# Patient Record
Sex: MALE | Race: WHITE | NOT HISPANIC OR LATINO | Employment: FULL TIME | URBAN - METROPOLITAN AREA
[De-identification: names, ages, dates, MRNs, and addresses within clinical notes are randomized per-mention and may not be internally consistent; named-entity substitution may affect disease eponyms.]

---

## 2023-09-19 PROBLEM — I47.10 SUPRAVENTRICULAR TACHYCARDIA, PAROXYSMAL (CMS-HCC): Status: ACTIVE | Noted: 2023-09-19

## 2023-09-19 PROBLEM — I47.20 VENTRICULAR TACHYCARDIA (PAROXYSMAL): Status: ACTIVE | Noted: 2023-09-19

## 2023-09-19 PROBLEM — R41.3 MEMORY LOSS: Status: ACTIVE | Noted: 2023-09-19

## 2023-09-19 PROBLEM — R00.1 BRADYCARDIA BY ELECTROCARDIOGRAM: Status: ACTIVE | Noted: 2023-09-19

## 2023-09-19 PROBLEM — G47.9 DISTURBANCE OF SLEEP: Status: ACTIVE | Noted: 2023-09-19

## 2023-09-19 PROBLEM — I10 HYPERTENSION: Status: ACTIVE | Noted: 2023-09-19

## 2023-09-19 PROBLEM — M70.61 TROCHANTERIC BURSITIS OF RIGHT HIP: Status: ACTIVE | Noted: 2023-09-19

## 2023-09-19 PROBLEM — M19.90 ARTHRITIS: Status: ACTIVE | Noted: 2023-09-19

## 2023-09-19 PROBLEM — R55 SYNCOPE: Status: ACTIVE | Noted: 2023-09-19

## 2023-09-19 PROBLEM — E78.1 HYPERTRIGLYCERIDEMIA: Status: ACTIVE | Noted: 2023-09-19

## 2023-09-19 PROBLEM — M76.31 ILIOTIBIAL BAND SYNDROME OF RIGHT SIDE: Status: ACTIVE | Noted: 2023-09-19

## 2023-09-19 PROBLEM — Z95.1 S/P CABG X 4: Status: ACTIVE | Noted: 2023-09-19

## 2023-09-19 PROBLEM — H81.13 BENIGN PAROXYSMAL POSITIONAL VERTIGO DUE TO BILATERAL VESTIBULAR DISORDER: Status: ACTIVE | Noted: 2023-09-19

## 2023-09-19 PROBLEM — F41.9 ANXIETY: Status: ACTIVE | Noted: 2023-09-19

## 2023-09-19 PROBLEM — I49.3 PVC (PREMATURE VENTRICULAR CONTRACTION): Status: ACTIVE | Noted: 2023-09-19

## 2023-09-19 PROBLEM — R94.31 ABNORMAL EKG: Status: ACTIVE | Noted: 2023-09-19

## 2023-09-19 PROBLEM — I47.29 VENTRICULAR TACHYCARDIA (PAROXYSMAL) (MULTI): Status: ACTIVE | Noted: 2023-09-19

## 2023-09-19 PROBLEM — T78.40XA ALLERGIES: Status: ACTIVE | Noted: 2023-09-19

## 2023-09-19 PROBLEM — G47.33 OBSTRUCTIVE SLEEP APNEA ON CPAP: Status: ACTIVE | Noted: 2023-09-19

## 2023-09-19 PROBLEM — J30.2 SEASONAL ALLERGIC RHINITIS: Status: ACTIVE | Noted: 2022-03-24

## 2023-09-19 PROBLEM — I25.10 CAD, MULTIPLE VESSEL: Status: ACTIVE | Noted: 2023-09-19

## 2023-09-19 PROBLEM — H81.4 VERTIGO OF CENTRAL ORIGIN: Status: ACTIVE | Noted: 2022-04-26

## 2023-09-19 PROBLEM — D72.829 ELEVATED WBC COUNT: Status: ACTIVE | Noted: 2023-09-19

## 2023-09-19 PROBLEM — H90.3 ASYMMETRICAL SENSORINEURAL HEARING LOSS: Status: ACTIVE | Noted: 2022-04-26

## 2023-09-19 PROBLEM — H90.3 SENSORINEURAL HEARING LOSS, BILATERAL: Status: ACTIVE | Noted: 2022-04-26

## 2023-09-19 PROBLEM — R94.30 ABNORMAL CARDIOVASCULAR FUNCTION STUDY: Status: ACTIVE | Noted: 2023-09-19

## 2023-09-19 PROBLEM — G31.84 MILD COGNITIVE IMPAIRMENT: Status: ACTIVE | Noted: 2023-09-19

## 2023-09-19 PROBLEM — E66.3 OVERWEIGHT WITH BODY MASS INDEX (BMI) OF 28 TO 28.9 IN ADULT: Status: ACTIVE | Noted: 2023-09-19

## 2023-09-19 PROBLEM — E78.5 HYPERLIPIDEMIA: Status: ACTIVE | Noted: 2023-09-19

## 2023-09-19 PROBLEM — H93.13 BILATERAL TINNITUS: Status: ACTIVE | Noted: 2022-03-24

## 2023-09-19 PROBLEM — R73.09 ELEVATED GLUCOSE LEVEL: Status: ACTIVE | Noted: 2023-09-19

## 2023-09-19 PROBLEM — J01.90 ACUTE NON-RECURRENT SINUSITIS: Status: ACTIVE | Noted: 2023-09-19

## 2023-09-19 PROBLEM — R40.0 DAYTIME SOMNOLENCE: Status: ACTIVE | Noted: 2023-09-19

## 2023-09-19 RX ORDER — NITROGLYCERIN 0.4 MG/1
TABLET SUBLINGUAL
COMMUNITY
End: 2024-05-22 | Stop reason: ALTCHOICE

## 2023-09-19 RX ORDER — OXYMETAZOLINE HCL 0.05 %
30 SPRAY, NON-AEROSOL (ML) NASAL NIGHTLY
COMMUNITY
End: 2023-11-09 | Stop reason: WASHOUT

## 2023-09-19 RX ORDER — ASPIRIN 81 MG/1
1 TABLET ORAL DAILY
COMMUNITY
Start: 2021-10-29

## 2023-09-19 RX ORDER — ATORVASTATIN CALCIUM 80 MG/1
1 TABLET, FILM COATED ORAL NIGHTLY
COMMUNITY
Start: 2021-12-03 | End: 2023-11-09 | Stop reason: WASHOUT

## 2023-09-19 RX ORDER — METOPROLOL SUCCINATE 25 MG/1
0.5 TABLET, EXTENDED RELEASE ORAL DAILY
COMMUNITY
Start: 2022-09-08 | End: 2023-11-09 | Stop reason: WASHOUT

## 2023-09-19 RX ORDER — POTASSIUM CHLORIDE 20 MEQ/1
1 TABLET, EXTENDED RELEASE ORAL DAILY
COMMUNITY
End: 2023-11-09 | Stop reason: WASHOUT

## 2023-09-19 RX ORDER — CHLORHEXIDINE GLUCONATE ORAL RINSE 1.2 MG/ML
15 SOLUTION DENTAL DAILY
COMMUNITY
End: 2023-11-09 | Stop reason: WASHOUT

## 2023-09-19 RX ORDER — FUROSEMIDE 40 MG/1
1 TABLET ORAL DAILY
COMMUNITY
End: 2023-11-09 | Stop reason: WASHOUT

## 2023-09-19 RX ORDER — ATORVASTATIN CALCIUM 20 MG/1
TABLET, FILM COATED ORAL
COMMUNITY
End: 2023-11-09 | Stop reason: WASHOUT

## 2023-09-19 RX ORDER — LISINOPRIL 20 MG/1
TABLET ORAL
COMMUNITY
End: 2023-11-09 | Stop reason: WASHOUT

## 2023-09-19 RX ORDER — CLOPIDOGREL BISULFATE 75 MG/1
1 TABLET ORAL DAILY
COMMUNITY
End: 2023-11-09 | Stop reason: WASHOUT

## 2023-09-19 RX ORDER — LISINOPRIL 10 MG/1
TABLET ORAL
COMMUNITY
End: 2023-11-09 | Stop reason: WASHOUT

## 2023-09-19 RX ORDER — AZELASTINE 1 MG/ML
2 SPRAY, METERED NASAL 2 TIMES DAILY
COMMUNITY
End: 2023-11-09 | Stop reason: WASHOUT

## 2023-09-19 RX ORDER — POLYETHYLENE GLYCOL 3350 17 G/17G
17 POWDER, FOR SOLUTION ORAL DAILY
COMMUNITY
Start: 2021-11-27 | End: 2023-11-09 | Stop reason: WASHOUT

## 2023-09-19 RX ORDER — DOXYCYCLINE HYCLATE 20 MG
TABLET ORAL
COMMUNITY
Start: 2021-12-09 | End: 2023-11-09 | Stop reason: WASHOUT

## 2023-09-19 RX ORDER — AMLODIPINE BESYLATE 5 MG/1
TABLET ORAL
COMMUNITY
End: 2023-11-09 | Stop reason: WASHOUT

## 2023-09-19 RX ORDER — METOPROLOL TARTRATE 50 MG/1
1 TABLET ORAL 2 TIMES DAILY
COMMUNITY
End: 2023-11-09 | Stop reason: WASHOUT

## 2023-10-19 ENCOUNTER — APPOINTMENT (OUTPATIENT)
Dept: CARDIOLOGY | Facility: CLINIC | Age: 68
End: 2023-10-19
Payer: MEDICARE

## 2023-11-04 PROBLEM — R42 DIZZINESS: Status: ACTIVE | Noted: 2022-06-06

## 2023-11-04 RX ORDER — METRONIDAZOLE 7.5 MG/G
CREAM TOPICAL
COMMUNITY
Start: 2021-01-15 | End: 2023-11-09 | Stop reason: WASHOUT

## 2023-11-04 RX ORDER — FLUTICASONE PROPIONATE 50 MCG
2 SPRAY, SUSPENSION (ML) NASAL DAILY
COMMUNITY
Start: 2023-01-16 | End: 2023-11-09 | Stop reason: WASHOUT

## 2023-11-04 RX ORDER — LISINOPRIL AND HYDROCHLOROTHIAZIDE 10; 12.5 MG/1; MG/1
1 TABLET ORAL
COMMUNITY
End: 2023-11-09 | Stop reason: WASHOUT

## 2023-11-04 RX ORDER — LORATADINE 10 MG/1
10 TABLET ORAL
COMMUNITY
Start: 2023-01-16 | End: 2023-11-09 | Stop reason: WASHOUT

## 2023-11-09 ENCOUNTER — OFFICE VISIT (OUTPATIENT)
Dept: CARDIOLOGY | Facility: CLINIC | Age: 68
End: 2023-11-09
Payer: MEDICARE

## 2023-11-09 VITALS
SYSTOLIC BLOOD PRESSURE: 140 MMHG | BODY MASS INDEX: 28.2 KG/M2 | WEIGHT: 197 LBS | HEIGHT: 70 IN | DIASTOLIC BLOOD PRESSURE: 82 MMHG | HEART RATE: 59 BPM

## 2023-11-09 DIAGNOSIS — I25.10 CAD, MULTIPLE VESSEL: Primary | ICD-10-CM

## 2023-11-09 DIAGNOSIS — E78.1 HYPERTRIGLYCERIDEMIA: ICD-10-CM

## 2023-11-09 DIAGNOSIS — Z95.1 S/P CABG X 4: ICD-10-CM

## 2023-11-09 DIAGNOSIS — I49.3 PVC (PREMATURE VENTRICULAR CONTRACTION): ICD-10-CM

## 2023-11-09 DIAGNOSIS — I10 HYPERTENSION, UNSPECIFIED TYPE: ICD-10-CM

## 2023-11-09 PROCEDURE — 99214 OFFICE O/P EST MOD 30 MIN: CPT | Performed by: INTERNAL MEDICINE

## 2023-11-09 PROCEDURE — 3079F DIAST BP 80-89 MM HG: CPT | Performed by: INTERNAL MEDICINE

## 2023-11-09 PROCEDURE — 93000 ELECTROCARDIOGRAM COMPLETE: CPT | Performed by: INTERNAL MEDICINE

## 2023-11-09 PROCEDURE — 3077F SYST BP >= 140 MM HG: CPT | Performed by: INTERNAL MEDICINE

## 2023-11-09 PROCEDURE — 1126F AMNT PAIN NOTED NONE PRSNT: CPT | Performed by: INTERNAL MEDICINE

## 2023-11-09 PROCEDURE — 1036F TOBACCO NON-USER: CPT | Performed by: INTERNAL MEDICINE

## 2023-11-09 RX ORDER — ATORVASTATIN CALCIUM 40 MG/1
40 TABLET, FILM COATED ORAL DAILY
Qty: 90 TABLET | Refills: 3 | Status: SHIPPED | OUTPATIENT
Start: 2023-11-09 | End: 2024-11-08

## 2023-11-09 RX ORDER — MULTIVITAMIN
TABLET ORAL
COMMUNITY
Start: 2021-12-09

## 2023-11-09 NOTE — PATIENT INSTRUCTIONS
Thanks for following up in office today.    1)  We are going to reduce your atorvastatin dose to 40mg once a day.  For now, you can cut your atorvastatin in half and take half daily.  I did send a new prescription of 40mg pills to [facility] for when you run out of the current pills.    2)  Please get your bloodwork prior to visit with Dr. Landaverde - including cholesterol bloodwork    3)  I ordered an echocardiogram (heart ultrasound), to make sure your heart function is still ok.    4)  Importantly - if you faint or feel dizzy like you are going to faint, call 911 and let my office know.  This could mean slowing of your heart rate.  Right now your heart rate is ok, and there is no need for a pacemker.    5)  Please continue your cardiac medications as prescribed.    Follow up with me in about 4 weeks in [place] after echo  If you have any questions, please call [phone] and choose option for Dr. Shipman's nurse Sindi Fiztgerald

## 2023-11-09 NOTE — PROGRESS NOTES
Chief Complaint:   No chief complaint on file.     History Of Present Illness:    Osmar Pond is a 68 y.o. male presenting for 6 month follow up.  H/o severe multivessel CAD s/p CABG x4 (L-LAD, S-AM and PDA, Radial-OM1), pSVT, pVT, HTN, syncope, moderate RIOS, vertigo, ? dementia who presents for follow up. Osmar is here with his wife today.    No chest pain, LEON, LE edema.  Does have constant sinus fullness and congestion which is chronic.    Last visit in 6/2023 due to slow HR's we stopped his metoprolol.  We also recommended LE compression stockings due to chronic LE edema.  Telling me that he did not have any significant improvement in fatigue since stopping metoprolol.  HR today is 57.  On his fitbit, his average heart rates have been 55-60 bpm.  He denies any fainting spells / overt syncope.    He does have an appt upcoming with Dr. Landaverde, and should be having yearly labs.    ROS:  The remainder of the review of systems was obtained, as was negative as pertains to the chief complaint.      Prior CV Testing Reviewed Today:    Holter monitor 2/22- average HR was 63 bpm. No afib or pauses. 64 episodes of pSVT (31 beats longest) and 11 episodes of NSVT (4 beats longest). 3.2% PVC burden. Patient events were associated with SB/SR with HR's 51 - 100 bpm.     TTE 12/2021: LEF 45-50% with global HK, mod decreased RVSF        Last Recorded Vitals:  There were no vitals filed for this visit.    Past Medical History:  He has no past medical history on file.    Past Surgical History:  He has a past surgical history that includes Other surgical history (11/05/2018) and Other surgical history (12/09/2021).      Social History:  He has no history on file for tobacco use, alcohol use, and drug use.    Family History:  Family History   Problem Relation Name Age of Onset    Dementia Mother      No Known Problems Father          Allergies:  Patient has no known allergies.    Outpatient Medications:  Current Outpatient  Medications   Medication Instructions    amLODIPine (Norvasc) 5 mg tablet     aspirin 81 mg EC tablet 1 tablet, oral, Daily, As directed    aspirin-acetaminophen-caffeine (Excedrin Migraine) 250-250-65 mg tablet oral, Every 6 hours PRN    atorvastatin (Lipitor) 20 mg tablet     atorvastatin (Lipitor) 80 mg tablet 1 tablet, oral, Nightly    azelastine (Astelin) 137 mcg (0.1 %) nasal spray 2 sprays, Each Nostril, 2 times daily    chlorhexidine (Peridex) 0.12 % solution 15 mL, Mouth/Throat, Daily, As directed    clopidogrel (Plavix) 75 mg tablet 1 tablet, oral, Daily    diphenhydrAMINE HCL (ZzzQuiL) 50 mg/30 mL liquid 30 mL, oral, Nightly    doxycycline (Periostat) 20 mg tablet     fluticasone (Flonase) 50 mcg/actuation nasal spray 2 sprays, Each Nostril, Daily    furosemide (Lasix) 40 mg tablet 1 tablet, oral, Daily    lisinopril 10 mg tablet     lisinopril 20 mg tablet     lisinopriL-hydrochlorothiazide 10-12.5 mg tablet 1 tablet, oral, Daily RT    loratadine (CLARITIN) 10 mg, oral, Daily RT    metoprolol succinate XL (Toprol-XL) 25 mg 24 hr tablet 0.5 tablets, oral, Daily    metoprolol tartrate (Lopressor) 50 mg tablet 1 half tablet, oral, 2 times daily    metroNIDAZOLE (Metrocream) 0.75 % cream Apply to face bid    nitroglycerin (Nitrostat) 0.4 mg SL tablet     polyethylene glycol (GLYCOLAX, MIRALAX) 17 g, oral, Daily    potassium chloride CR 20 mEq ER tablet 1 tablet, oral, Daily, TAKE WITH LASIX (FUROSEMIDE)    triamcinolone acetonide (KENALOG) 20 mg, intralesional       Physical Exam: Physical Exam  HENT:      Head: Normocephalic.      Nose: Nose normal.      Mouth/Throat:      Mouth: Mucous membranes are dry.   Cardiovascular:      Rate and Rhythm: Regular rhythm. Bradycardia present.      Comments: No carotid bruits  Pulmonary:      Effort: Pulmonary effort is normal.      Breath sounds: Normal breath sounds.      Comments: Minimal crackles at the bases    Abdominal:      Palpations: Abdomen is soft.  "  Musculoskeletal:         General: Normal range of motion.      Cervical back: Normal range of motion.   Skin:     General: Skin is warm and dry.   Neurological:      Mental Status: He is alert. Mental status is at baseline.   Psychiatric:         Mood and Affect: Mood normal.           Last Labs:  CBC -  Lab Results   Component Value Date    WBC 10.6 11/08/2022    HGB 14.8 11/08/2022    HCT 43.7 11/08/2022    MCV 94 11/08/2022     11/08/2022       CMP -  Lab Results   Component Value Date    CALCIUM 10.1 11/08/2022    PHOS 3.2 11/27/2021    PROT 7.4 11/08/2022    ALBUMIN 4.6 11/08/2022    AST 31 11/08/2022    ALT 23 11/08/2022    ALKPHOS 61 11/08/2022    BILITOT 1.1 11/08/2022       LIPID PANEL -   Lab Results   Component Value Date    CHOL 147 11/08/2022    TRIG 92 11/08/2022    HDL 59.7 11/08/2022    CHHDL 2.5 11/08/2022    LDLF 69 11/08/2022    VLDL 18 11/08/2022    NHDL 132 08/18/2021       RENAL FUNCTION PANEL -   Lab Results   Component Value Date    GLUCOSE 79 11/08/2022     11/08/2022    K 4.8 11/08/2022     11/08/2022    CO2 27 11/08/2022    ANIONGAP 16 11/08/2022    BUN 12 11/08/2022    CREATININE 0.94 11/08/2022    GFRMALE 90 11/08/2022    CALCIUM 10.1 11/08/2022    PHOS 3.2 11/27/2021    ALBUMIN 4.6 11/08/2022        No results found for: \"BNP\", \"HGBA1C\"      Assessment/Plan   Impression:  CAD s/p CABG x3 11/21  -continue ASA  -FLP last year reviewed, we will try to reduce atorvastatin to 40mg daily  -to have FLP checked by PCP  -not tolerating BB d/t bradycardia    LV dysfcn TTE in 2021 with LVEF 45-50%, with mod reduced RVSF  -not tolerating BB d/t bradycardia  -repeat TTE ordered for LV fcn        Syncope - holter without clear cause of syncope  -to call us with any presyncope/syncope    HTN:  140/82 today, has been ok off BB  -serial monitoring  -consider addition of antihypertensive if still high next visit    pSVT:  -monitor symptoms    Positional dizziness - possible BPPV   "   RIOS:  -we discussed CPAP compliance    Memory Loss:  -reduce statin dose  -continue to follow with  neurology and PCP re. cognitive eval, memory loss, word finding difficulties      Brayan Shipman MD

## 2023-11-14 ENCOUNTER — APPOINTMENT (OUTPATIENT)
Dept: PRIMARY CARE | Facility: CLINIC | Age: 68
End: 2023-11-14
Payer: MEDICARE

## 2023-11-15 ENCOUNTER — LAB (OUTPATIENT)
Dept: LAB | Facility: LAB | Age: 68
End: 2023-11-15
Payer: MEDICARE

## 2023-11-15 DIAGNOSIS — E78.5 HYPERLIPIDEMIA, UNSPECIFIED HYPERLIPIDEMIA TYPE: ICD-10-CM

## 2023-11-15 DIAGNOSIS — Z12.5 SCREENING FOR PROSTATE CANCER: ICD-10-CM

## 2023-11-15 DIAGNOSIS — I10 HYPERTENSION, UNSPECIFIED TYPE: ICD-10-CM

## 2023-11-15 DIAGNOSIS — Z00.00 HEALTH MAINTENANCE EXAMINATION: ICD-10-CM

## 2023-11-15 DIAGNOSIS — R73.09 ELEVATED GLUCOSE LEVEL: ICD-10-CM

## 2023-11-15 DIAGNOSIS — E78.1 HYPERTRIGLYCERIDEMIA: ICD-10-CM

## 2023-11-15 LAB
ALBUMIN SERPL BCP-MCNC: 4.3 G/DL (ref 3.4–5)
ALP SERPL-CCNC: 68 U/L (ref 33–136)
ALT SERPL W P-5'-P-CCNC: 29 U/L (ref 10–52)
ANION GAP SERPL CALC-SCNC: 11 MMOL/L (ref 10–20)
AST SERPL W P-5'-P-CCNC: 32 U/L (ref 9–39)
BILIRUB SERPL-MCNC: 0.6 MG/DL (ref 0–1.2)
BUN SERPL-MCNC: 15 MG/DL (ref 6–23)
CALCIUM SERPL-MCNC: 9.9 MG/DL (ref 8.6–10.3)
CHLORIDE SERPL-SCNC: 104 MMOL/L (ref 98–107)
CHOLEST SERPL-MCNC: 159 MG/DL (ref 0–199)
CHOLESTEROL/HDL RATIO: 2.2
CO2 SERPL-SCNC: 30 MMOL/L (ref 21–32)
CREAT SERPL-MCNC: 0.77 MG/DL (ref 0.5–1.3)
ERYTHROCYTE [DISTWIDTH] IN BLOOD BY AUTOMATED COUNT: 12.7 % (ref 11.5–14.5)
GFR SERPL CREATININE-BSD FRML MDRD: >90 ML/MIN/1.73M*2
GLUCOSE SERPL-MCNC: 91 MG/DL (ref 74–99)
HCT VFR BLD AUTO: 45.4 % (ref 41–52)
HDLC SERPL-MCNC: 71.6 MG/DL
HGB BLD-MCNC: 15 G/DL (ref 13.5–17.5)
LDLC SERPL CALC-MCNC: 70 MG/DL
MCH RBC QN AUTO: 31.1 PG (ref 26–34)
MCHC RBC AUTO-ENTMCNC: 33 G/DL (ref 32–36)
MCV RBC AUTO: 94 FL (ref 80–100)
NON HDL CHOLESTEROL: 87 MG/DL (ref 0–149)
NRBC BLD-RTO: 0 /100 WBCS (ref 0–0)
PLATELET # BLD AUTO: 226 X10*3/UL (ref 150–450)
POTASSIUM SERPL-SCNC: 4.7 MMOL/L (ref 3.5–5.3)
PROT SERPL-MCNC: 6.4 G/DL (ref 6.4–8.2)
PSA SERPL-MCNC: 0.67 NG/ML
RBC # BLD AUTO: 4.82 X10*6/UL (ref 4.5–5.9)
SODIUM SERPL-SCNC: 140 MMOL/L (ref 136–145)
TRIGL SERPL-MCNC: 85 MG/DL (ref 0–149)
VLDL: 17 MG/DL (ref 0–40)
WBC # BLD AUTO: 11.5 X10*3/UL (ref 4.4–11.3)

## 2023-11-15 PROCEDURE — G0103 PSA SCREENING: HCPCS

## 2023-11-15 PROCEDURE — 36415 COLL VENOUS BLD VENIPUNCTURE: CPT

## 2023-11-15 PROCEDURE — 85027 COMPLETE CBC AUTOMATED: CPT

## 2023-11-15 PROCEDURE — 80053 COMPREHEN METABOLIC PANEL: CPT

## 2023-11-15 PROCEDURE — 80061 LIPID PANEL: CPT

## 2023-11-16 ENCOUNTER — OFFICE VISIT (OUTPATIENT)
Dept: PRIMARY CARE | Facility: CLINIC | Age: 68
End: 2023-11-16
Payer: MEDICARE

## 2023-11-16 VITALS
HEART RATE: 68 BPM | SYSTOLIC BLOOD PRESSURE: 136 MMHG | WEIGHT: 200 LBS | TEMPERATURE: 98.2 F | HEIGHT: 70 IN | DIASTOLIC BLOOD PRESSURE: 70 MMHG | BODY MASS INDEX: 28.63 KG/M2 | OXYGEN SATURATION: 97 %

## 2023-11-16 DIAGNOSIS — N13.8 BPH WITH OBSTRUCTION/LOWER URINARY TRACT SYMPTOMS: Primary | ICD-10-CM

## 2023-11-16 DIAGNOSIS — N40.1 BPH WITH OBSTRUCTION/LOWER URINARY TRACT SYMPTOMS: Primary | ICD-10-CM

## 2023-11-16 DIAGNOSIS — D72.829 LEUKOCYTOSIS, UNSPECIFIED TYPE: ICD-10-CM

## 2023-11-16 DIAGNOSIS — Z00.00 HEALTH MAINTENANCE EXAMINATION: ICD-10-CM

## 2023-11-16 PROCEDURE — 3075F SYST BP GE 130 - 139MM HG: CPT | Performed by: FAMILY MEDICINE

## 2023-11-16 PROCEDURE — 1170F FXNL STATUS ASSESSED: CPT | Performed by: FAMILY MEDICINE

## 2023-11-16 PROCEDURE — 1036F TOBACCO NON-USER: CPT | Performed by: FAMILY MEDICINE

## 2023-11-16 PROCEDURE — 1126F AMNT PAIN NOTED NONE PRSNT: CPT | Performed by: FAMILY MEDICINE

## 2023-11-16 PROCEDURE — 99213 OFFICE O/P EST LOW 20 MIN: CPT | Performed by: FAMILY MEDICINE

## 2023-11-16 PROCEDURE — 1159F MED LIST DOCD IN RCRD: CPT | Performed by: FAMILY MEDICINE

## 2023-11-16 PROCEDURE — 99397 PER PM REEVAL EST PAT 65+ YR: CPT | Performed by: FAMILY MEDICINE

## 2023-11-16 PROCEDURE — 3078F DIAST BP <80 MM HG: CPT | Performed by: FAMILY MEDICINE

## 2023-11-16 RX ORDER — TADALAFIL 5 MG/1
5 TABLET ORAL DAILY
Qty: 90 TABLET | Refills: 0 | Status: SHIPPED | OUTPATIENT
Start: 2023-11-16 | End: 2023-12-28

## 2023-11-16 ASSESSMENT — ENCOUNTER SYMPTOMS
DEPRESSION: 0
LOSS OF SENSATION IN FEET: 0
OCCASIONAL FEELINGS OF UNSTEADINESS: 0

## 2023-11-16 ASSESSMENT — ACTIVITIES OF DAILY LIVING (ADL)
BATHING: INDEPENDENT
DRESSING: INDEPENDENT
DOING_HOUSEWORK: INDEPENDENT
GROCERY_SHOPPING: INDEPENDENT
TAKING_MEDICATION: INDEPENDENT
MANAGING_FINANCES: INDEPENDENT

## 2023-11-16 NOTE — PROGRESS NOTES
"Subjective   Reason for Visit: Osmar Pond is an 68 y.o. male here for a Medicare Wellness visit.      Denies any family history of colon and prostate cancer. Last Colonoscopy done 2/2023, benign polyp removed. Next colonoscopy is due in five years.     States over the past few months he's been having difficulty emptying his bladder. Denies painful urination.     Reviewed all medications by prescribing practitioner or clinical pharmacist (such as prescriptions, OTCs, herbal therapies and supplements) and documented in the medical record.    HPI    Patient Care Team:  Julio Landaverde DO as PCP - General  Julio Landaverde DO as PCP - [employer] PCP     Review of Systems   All other systems reviewed and are negative.      Objective   Vitals:  /70   Pulse 68   Temp 36.8 °C (98.2 °F)   Ht 1.778 m (5' 10\")   Wt 90.7 kg (200 lb)   SpO2 97%   BMI 28.70 kg/m²       Physical Exam  Constitutional:       Appearance: Normal appearance.   Eyes:      Conjunctiva/sclera: Conjunctivae normal.   Cardiovascular:      Rate and Rhythm: Normal rate and regular rhythm.   Pulmonary:      Effort: Pulmonary effort is normal.      Breath sounds: Normal breath sounds.   Abdominal:      Palpations: Abdomen is soft.   Musculoskeletal:         General: Normal range of motion.   Skin:     General: Skin is warm and dry.   Neurological:      Mental Status: He is alert.   Psychiatric:         Mood and Affect: Mood normal.       Assessment/Plan Recheck CBC 6-8 weeks. Discussed medication options for BPH. He is only interested in Cialis at this time. Order was sent into his pharmacy and put on hold. He understands this cannot be taken with nitroglycerin. States he currently does not need nitroglycerin and has never used, however, he also understands that if he is taking Cialis he could not in the future take his Nitroglycerin should he need it. He would like to discuss with cardiology. If cardiology is not ok with " stopping order for PRN Nitroglycerin, then he will RTC to discuss additional options at his discretion. I did recommend US of the bladder/prostate with PVR, however, he is refusing at this time.   Reviewed lab work and all was essentially unremarkable with the exception of his CBC as noted above.   No colon CA screen due.  Exam unremarkable.  Repeat well exam in 1 year.  Problem List Items Addressed This Visit    None

## 2023-12-06 ENCOUNTER — HOSPITAL ENCOUNTER (OUTPATIENT)
Dept: CARDIOLOGY | Facility: HOSPITAL | Age: 68
Discharge: HOME | End: 2023-12-06
Payer: MEDICARE

## 2023-12-06 DIAGNOSIS — I25.10 CAD, MULTIPLE VESSEL: ICD-10-CM

## 2023-12-06 DIAGNOSIS — I49.3 PVC (PREMATURE VENTRICULAR CONTRACTION): ICD-10-CM

## 2023-12-06 DIAGNOSIS — I10 HYPERTENSION, UNSPECIFIED TYPE: ICD-10-CM

## 2023-12-06 DIAGNOSIS — E78.1 HYPERTRIGLYCERIDEMIA: ICD-10-CM

## 2023-12-06 DIAGNOSIS — Z95.1 S/P CABG X 4: ICD-10-CM

## 2023-12-06 PROCEDURE — 2500000004 HC RX 250 GENERAL PHARMACY W/ HCPCS (ALT 636 FOR OP/ED): Performed by: INTERNAL MEDICINE

## 2023-12-06 PROCEDURE — 93306 TTE W/DOPPLER COMPLETE: CPT

## 2023-12-06 PROCEDURE — 93306 TTE W/DOPPLER COMPLETE: CPT | Performed by: INTERNAL MEDICINE

## 2023-12-06 RX ADMIN — HUMAN ALBUMIN MICROSPHERES AND PERFLUTREN 0.5 ML: 10; .22 INJECTION, SOLUTION INTRAVENOUS at 10:27

## 2023-12-07 LAB
AORTIC VALVE MEAN GRADIENT: 4.7
AORTIC VALVE PEAK VELOCITY: 1.56
AV PEAK GRADIENT: 9.7
AVA (PEAK VEL): 3
AVA (VTI): 3.22
EJECTION FRACTION APICAL 4 CHAMBER: 51.4
LEFT ATRIUM VOLUME AREA LENGTH INDEX BSA: 31.5
LEFT VENTRICLE INTERNAL DIMENSION DIASTOLE: 4.67 (ref 3.5–6)
LEFT VENTRICULAR OUTFLOW TRACT DIAMETER: 2.32
MITRAL VALVE E/A RATIO: 0.83
MITRAL VALVE E/E' RATIO: 5.83
RIGHT VENTRICLE FREE WALL PEAK S': 9.59
RIGHT VENTRICLE PEAK SYSTOLIC PRESSURE: 24.6
TRICUSPID ANNULAR PLANE SYSTOLIC EXCURSION: 1.5

## 2023-12-10 PROBLEM — J34.89 NASAL DISCHARGE: Status: ACTIVE | Noted: 2023-12-10

## 2023-12-10 PROBLEM — I25.10 MULTIPLE VESSEL CORONARY ARTERY DISEASE: Status: ACTIVE | Noted: 2021-11-22

## 2023-12-10 PROBLEM — R07.89 SENSATION OF CHEST TIGHTNESS: Status: ACTIVE | Noted: 2023-12-10

## 2023-12-10 PROBLEM — H81.10 BENIGN PAROXYSMAL POSITIONAL VERTIGO: Status: ACTIVE | Noted: 2022-04-26

## 2023-12-10 PROBLEM — N40.1 BENIGN PROSTATIC HYPERPLASIA WITH URINARY OBSTRUCTION: Status: ACTIVE | Noted: 2023-12-10

## 2023-12-10 PROBLEM — R10.32 LEFT LOWER QUADRANT ABDOMINAL PAIN: Status: ACTIVE | Noted: 2023-12-10

## 2023-12-10 PROBLEM — J98.6 DISORDER OF DIAPHRAGM: Status: ACTIVE | Noted: 2023-12-10

## 2023-12-10 PROBLEM — R41.89 IMPAIRED COGNITION: Status: ACTIVE | Noted: 2023-09-19

## 2023-12-10 PROBLEM — J34.9 DISORDER OF PARANASAL SINUS: Status: ACTIVE | Noted: 2023-12-10

## 2023-12-10 PROBLEM — M70.60 GREATER TROCHANTERIC BURSITIS: Status: ACTIVE | Noted: 2023-12-10

## 2023-12-10 PROBLEM — L71.9 ROSACEA: Status: ACTIVE | Noted: 2023-12-10

## 2023-12-10 PROBLEM — N13.8 BENIGN PROSTATIC HYPERPLASIA WITH URINARY OBSTRUCTION: Status: ACTIVE | Noted: 2023-12-10

## 2023-12-15 ENCOUNTER — OFFICE VISIT (OUTPATIENT)
Dept: CARDIOLOGY | Facility: CLINIC | Age: 68
End: 2023-12-15
Payer: MEDICARE

## 2023-12-15 VITALS
DIASTOLIC BLOOD PRESSURE: 78 MMHG | BODY MASS INDEX: 28.6 KG/M2 | HEIGHT: 70 IN | SYSTOLIC BLOOD PRESSURE: 118 MMHG | HEART RATE: 69 BPM | WEIGHT: 199.8 LBS

## 2023-12-15 DIAGNOSIS — R00.1 BRADYCARDIA BY ELECTROCARDIOGRAPHY: ICD-10-CM

## 2023-12-15 DIAGNOSIS — E78.5 HYPERLIPIDEMIA, UNSPECIFIED HYPERLIPIDEMIA TYPE: ICD-10-CM

## 2023-12-15 DIAGNOSIS — I25.10 MULTIPLE VESSEL CORONARY ARTERY DISEASE: Primary | ICD-10-CM

## 2023-12-15 DIAGNOSIS — I10 HYPERTENSION, UNSPECIFIED TYPE: ICD-10-CM

## 2023-12-15 DIAGNOSIS — I49.3 PVC (PREMATURE VENTRICULAR CONTRACTION): ICD-10-CM

## 2023-12-15 DIAGNOSIS — E78.1 HYPERTRIGLYCERIDEMIA: ICD-10-CM

## 2023-12-15 DIAGNOSIS — Z95.1 S/P CABG X 4: ICD-10-CM

## 2023-12-15 DIAGNOSIS — I25.10 CAD, MULTIPLE VESSEL: ICD-10-CM

## 2023-12-15 PROCEDURE — 3074F SYST BP LT 130 MM HG: CPT | Performed by: INTERNAL MEDICINE

## 2023-12-15 PROCEDURE — 1036F TOBACCO NON-USER: CPT | Performed by: INTERNAL MEDICINE

## 2023-12-15 PROCEDURE — 3078F DIAST BP <80 MM HG: CPT | Performed by: INTERNAL MEDICINE

## 2023-12-15 PROCEDURE — 1126F AMNT PAIN NOTED NONE PRSNT: CPT | Performed by: INTERNAL MEDICINE

## 2023-12-15 PROCEDURE — 1159F MED LIST DOCD IN RCRD: CPT | Performed by: INTERNAL MEDICINE

## 2023-12-15 PROCEDURE — 93000 ELECTROCARDIOGRAM COMPLETE: CPT | Performed by: INTERNAL MEDICINE

## 2023-12-15 PROCEDURE — 99214 OFFICE O/P EST MOD 30 MIN: CPT | Performed by: INTERNAL MEDICINE

## 2023-12-15 NOTE — PROGRESS NOTES
Chief Complaint:   No chief complaint on file.     History Of Present Illness:    Osmar Pond is a 68 y.o. male presenting to follow up echo results  H/o severe multivessel CAD s/p CABG x4 (L-LAD, S-AM and PDA, Radial-OM1), pSVT, pVT, HTN, syncope, moderate RIOS, vertigo, ? Dementia.    Osmar is doing generally well.  Denies any syncopal events.  Did have some dizziness, but not worsening.  Still occasionally feels tired, but also reporting insomnia / irregular sleeping habits.  No chest pain/pressure, SOB, LEON, LE edema.    ROS:  The remainder of the review of systems was obtained, as was negative as pertains to the chief complaint.    CV testing reviewed:       11/2023 lipid labs  chol 159, HDL 71.6, LDL 70, trig 85    11/2023  TTE   EF 40-45% stage I DD, mildly reduced RV fxn, trace to mild mitral regurg, trace tricuspid regurg    2/2022 Holter monitor - average HR was 63 bpm. No afib or pauses. 64 episodes of pSVT (31 beats longest) and 11 episodes of NSVT (4 beats longest). 3.2% PVC burden. Patient events were associated with SB/SR with HR's 51 - 100 bpm.     Last Recorded Vitals:  There were no vitals filed for this visit.    Past Medical History:  He has no past medical history on file.    Past Surgical History:  He has a past surgical history that includes Other surgical history (11/05/2018) and Other surgical history (12/09/2021).      Social History:  He reports that he has quit smoking. His smoking use included cigarettes. He has never used smokeless tobacco. No history on file for alcohol use and drug use.    Family History:  Family History   Problem Relation Name Age of Onset    Dementia Mother      No Known Problems Father          Allergies:  Patient has no known allergies.    Outpatient Medications:  Current Outpatient Medications   Medication Instructions    aspirin 81 mg EC tablet 1 tablet, oral, Daily, As directed    aspirin-acetaminophen-caffeine (Excedrin Migraine) 250-250-65 mg tablet oral,  "Every 6 hours PRN    atorvastatin (LIPITOR) 40 mg, oral, Daily    multivitamin (Daily Multi-Vitamin) tablet oral, Daily RT    nitroglycerin (Nitrostat) 0.4 mg SL tablet     tadalafil (CIALIS) 5 mg, oral, Daily       Physical Exam:  Physical Exam  HENT:      Head: Normocephalic.      Nose: Nose normal.      Mouth/Throat:      Mouth: Mucous membranes are moist.   Cardiovascular:      Rate and Rhythm: Regular rhythm.      Heart sounds: S1 normal and S2 normal.      Comments: Borderline bradycardic  Pulmonary:      Effort: Pulmonary effort is normal.      Breath sounds: Normal breath sounds.   Abdominal:      Palpations: Abdomen is soft.   Musculoskeletal:         General: Normal range of motion.      Cervical back: Normal range of motion.   Skin:     General: Skin is warm and dry.   Neurological:      Mental Status: He is alert. Mental status is at baseline.   Psychiatric:         Mood and Affect: Mood normal.           Last Labs:  CBC -  Lab Results   Component Value Date    WBC 11.5 (H) 11/15/2023    HGB 15.0 11/15/2023    HCT 45.4 11/15/2023    MCV 94 11/15/2023     11/15/2023       CMP -  Lab Results   Component Value Date    CALCIUM 9.9 11/15/2023    PHOS 3.2 11/27/2021    PROT 6.4 11/15/2023    ALBUMIN 4.3 11/15/2023    AST 32 11/15/2023    ALT 29 11/15/2023    ALKPHOS 68 11/15/2023    BILITOT 0.6 11/15/2023       LIPID PANEL -   Lab Results   Component Value Date    CHOL 159 11/15/2023    TRIG 85 11/15/2023    HDL 71.6 11/15/2023    CHHDL 2.2 11/15/2023    LDLF 69 11/08/2022    VLDL 17 11/15/2023    NHDL 87 11/15/2023       RENAL FUNCTION PANEL -   Lab Results   Component Value Date    GLUCOSE 91 11/15/2023     11/15/2023    K 4.7 11/15/2023     11/15/2023    CO2 30 11/15/2023    ANIONGAP 11 11/15/2023    BUN 15 11/15/2023    CREATININE 0.77 11/15/2023    GFRMALE 90 11/08/2022    CALCIUM 9.9 11/15/2023    PHOS 3.2 11/27/2021    ALBUMIN 4.3 11/15/2023        No results found for: \"BNP\", " "\"HGBA1C\"        Assessment/Plan   Problem List Items Addressed This Visit             ICD-10-CM       Cardiac and Vasculature    Bradycardia by electrocardiography R00.1     H/o bradycardia, did not tolerate BB due to this and fatigue.  HR on EKG today 62.      -serial monitoring for symptomatic bradycardia  -no current indication for PPM         Multiple vessel coronary artery disease - Primary I25.10     S/p CABG.  No chest pain/pressure.      -ASA 81mg daily  -atovastatin 40mg daily  -he was counseled on the lifethreatening interaction between nitrates and tadalafil, and tells me he is amenable to discontinuing tadalafil         Relevant Orders    Follow Up In Cardiology    Hyperlipidemia E78.5     Lipids 11/2023 at goal.    -atorvastatin 40mg daily         Relevant Orders    Follow Up In Cardiology    Hypertriglyceridemia E78.1    Relevant Orders    Follow Up In Cardiology    Hypertension I10     BP well controlled, not requiring any antihypertensive meds         Relevant Orders    Follow Up In Cardiology    PVC (premature ventricular contraction) I49.3     H/o occ PVC's, no significant symptoms.    -unable to tolerate BB         Relevant Orders    Follow Up In Cardiology    S/P CABG x 4 Z95.1    Relevant Orders    Follow Up In Cardiology    ECG 12 Lead (Completed)     Other Visit Diagnoses         Codes    CAD, multiple vessel     I25.10    Relevant Orders    Follow Up In Cardiology    ECG 12 Lead (Completed)                "

## 2023-12-15 NOTE — PATIENT INSTRUCTIONS
Thanks for following up in office today.    1)  We discussed the results of your echocardiogram today.  The heart function was just a little lower than the one done in 2021.  I do not think this is a huge change from prior. There was no wall motion abnormality seen. No significant valve issues.  I do not think that this is the reason for your tiredness.  I want you to continue to follow up with Dr Landaverde concerning your tiredness. .     2)  I do not want you to be taking the tadalafil.  It has a life threatening interaction with nitroglycerin.  There are other medications that can help with the prostrate.  Talk to Dr Landaverde.    3)  Please continue your cardiac medications as prescribed. Continue to remain on the lower dose of the statin.  Stay off the metoprolol for now.     Follow up with me  in   6 months  If you have any questions, please call [phone] and choose option for Dr. Shipman's nurse Sindi Fitzgerald

## 2023-12-28 NOTE — ASSESSMENT & PLAN NOTE
H/o bradycardia, did not tolerate BB due to this and fatigue.  HR on EKG today 62.      -serial monitoring for symptomatic bradycardia  -no current indication for PPM

## 2023-12-28 NOTE — ASSESSMENT & PLAN NOTE
S/p CABG.  No chest pain/pressure.      -ASA 81mg daily  -atovastatin 40mg daily  -he was counseled on the lifethreatening interaction between nitrates and tadalafil, and tells me he is amenable to discontinuing tadalafil

## 2024-01-02 ENCOUNTER — LAB (OUTPATIENT)
Dept: LAB | Facility: LAB | Age: 69
End: 2024-01-02
Payer: MEDICARE

## 2024-01-02 DIAGNOSIS — D72.829 LEUKOCYTOSIS, UNSPECIFIED TYPE: ICD-10-CM

## 2024-01-02 LAB
BASOPHILS # BLD AUTO: 0.06 X10*3/UL (ref 0–0.1)
BASOPHILS NFR BLD AUTO: 0.6 %
EOSINOPHIL # BLD AUTO: 0.1 X10*3/UL (ref 0–0.7)
EOSINOPHIL NFR BLD AUTO: 1 %
ERYTHROCYTE [DISTWIDTH] IN BLOOD BY AUTOMATED COUNT: 12.7 % (ref 11.5–14.5)
HCT VFR BLD AUTO: 44.1 % (ref 41–52)
HGB BLD-MCNC: 14.9 G/DL (ref 13.5–17.5)
IMM GRANULOCYTES # BLD AUTO: 0.08 X10*3/UL (ref 0–0.7)
IMM GRANULOCYTES NFR BLD AUTO: 0.8 % (ref 0–0.9)
LYMPHOCYTES # BLD AUTO: 1.45 X10*3/UL (ref 1.2–4.8)
LYMPHOCYTES NFR BLD AUTO: 14.1 %
MCH RBC QN AUTO: 31.7 PG (ref 26–34)
MCHC RBC AUTO-ENTMCNC: 33.8 G/DL (ref 32–36)
MCV RBC AUTO: 94 FL (ref 80–100)
MONOCYTES # BLD AUTO: 0.71 X10*3/UL (ref 0.1–1)
MONOCYTES NFR BLD AUTO: 6.9 %
NEUTROPHILS # BLD AUTO: 7.91 X10*3/UL (ref 1.2–7.7)
NEUTROPHILS NFR BLD AUTO: 76.6 %
NRBC BLD-RTO: 0 /100 WBCS (ref 0–0)
PLATELET # BLD AUTO: 241 X10*3/UL (ref 150–450)
RBC # BLD AUTO: 4.7 X10*6/UL (ref 4.5–5.9)
WBC # BLD AUTO: 10.3 X10*3/UL (ref 4.4–11.3)

## 2024-01-02 PROCEDURE — 85025 COMPLETE CBC W/AUTO DIFF WBC: CPT

## 2024-01-02 PROCEDURE — 36415 COLL VENOUS BLD VENIPUNCTURE: CPT

## 2024-01-16 ENCOUNTER — OFFICE VISIT (OUTPATIENT)
Dept: PRIMARY CARE | Facility: CLINIC | Age: 69
End: 2024-01-16
Payer: MEDICARE

## 2024-01-16 VITALS
SYSTOLIC BLOOD PRESSURE: 124 MMHG | WEIGHT: 200.8 LBS | DIASTOLIC BLOOD PRESSURE: 72 MMHG | HEIGHT: 70 IN | BODY MASS INDEX: 28.75 KG/M2 | HEART RATE: 63 BPM | OXYGEN SATURATION: 98 %

## 2024-01-16 DIAGNOSIS — N40.1 BENIGN PROSTATIC HYPERPLASIA WITH NOCTURIA: Primary | ICD-10-CM

## 2024-01-16 DIAGNOSIS — R41.3 MEMORY LOSS: ICD-10-CM

## 2024-01-16 DIAGNOSIS — R35.1 BENIGN PROSTATIC HYPERPLASIA WITH NOCTURIA: Primary | ICD-10-CM

## 2024-01-16 DIAGNOSIS — J31.0 CHRONIC RHINITIS: ICD-10-CM

## 2024-01-16 PROCEDURE — 3078F DIAST BP <80 MM HG: CPT | Performed by: FAMILY MEDICINE

## 2024-01-16 PROCEDURE — 3074F SYST BP LT 130 MM HG: CPT | Performed by: FAMILY MEDICINE

## 2024-01-16 PROCEDURE — 1036F TOBACCO NON-USER: CPT | Performed by: FAMILY MEDICINE

## 2024-01-16 PROCEDURE — 1159F MED LIST DOCD IN RCRD: CPT | Performed by: FAMILY MEDICINE

## 2024-01-16 PROCEDURE — 1126F AMNT PAIN NOTED NONE PRSNT: CPT | Performed by: FAMILY MEDICINE

## 2024-01-16 PROCEDURE — 99215 OFFICE O/P EST HI 40 MIN: CPT | Performed by: FAMILY MEDICINE

## 2024-01-16 RX ORDER — FINASTERIDE 5 MG/1
5 TABLET, FILM COATED ORAL DAILY
Qty: 30 TABLET | Refills: 2 | Status: SHIPPED | OUTPATIENT
Start: 2024-01-16 | End: 2024-04-15

## 2024-01-16 NOTE — PROGRESS NOTES
Subjective   Patient ID: Osmar Pond is a 68 y.o. male who presents for No chief complaint on file..    HPI Having issues with short term memory loss since 2021. Is forgetting things more often.     Wanted to discus medication options for his prostate as well. Feels like he has to urinate more frequently. Has noticed this issue increasing over the last year.     Review of Systems   All other systems reviewed and are negative.      Objective   There were no vitals taken for this visit.    Physical Exam  Constitutional:       Appearance: Normal appearance.   Eyes:      Conjunctiva/sclera: Conjunctivae normal.   Cardiovascular:      Rate and Rhythm: Normal rate and regular rhythm.   Pulmonary:      Effort: Pulmonary effort is normal.      Breath sounds: Normal breath sounds.   Abdominal:      Palpations: Abdomen is soft.   Musculoskeletal:         General: Normal range of motion.   Skin:     General: Skin is warm and dry.   Neurological:      Mental Status: He is alert.   Psychiatric:         Mood and Affect: Mood normal.         Assessment/Plan Will send to neurology ASAP for additional evaluation regarding memory loss of abouot 2-2.5 years of memory loss. Pt states this is mostly short term.  Discussed multiple medication options for BPH. He cannot take Cialis due to RX for nitroglycerin. Opted   To stay away for alpha-blockers as well. Rx for Finasteride given and we'll see him back to follow up with this in 12 weeks.  Discussed stopping chronic use of Guaifenesin and using nasal saline for chronic rhinitis.

## 2024-01-29 DIAGNOSIS — F03.B4 MODERATE DEMENTIA WITH ANXIETY, UNSPECIFIED DEMENTIA TYPE (MULTI): Primary | ICD-10-CM

## 2024-01-29 RX ORDER — MEMANTINE HYDROCHLORIDE 5 MG-10 MG
KIT ORAL
Qty: 49 TABLET | Refills: 0 | Status: SHIPPED | OUTPATIENT
Start: 2024-01-29 | End: 2024-05-22 | Stop reason: ALTCHOICE

## 2024-01-29 NOTE — PROGRESS NOTES
Sending Nemenda titration pack as it will be seversl weeks before he can get into see Neurology. We will follow up with him in 4 weeks.

## 2024-02-05 DIAGNOSIS — R41.3 MEMORY LOSS: ICD-10-CM

## 2024-02-05 RX ORDER — DONEPEZIL HYDROCHLORIDE 5 MG/1
5 TABLET, FILM COATED ORAL NIGHTLY
Qty: 30 TABLET | Refills: 0 | Status: SHIPPED | OUTPATIENT
Start: 2024-02-05 | End: 2024-05-22 | Stop reason: ALTCHOICE

## 2024-02-05 NOTE — TELEPHONE ENCOUNTER
Pharmacy called stating that Namenda is not covered by patients insurance, Aricept is preferred. Rx placed as discussed. Please approve. I will also call patient and have him schedule his 30 day follow up appointment after starting the medication.

## 2024-05-17 ENCOUNTER — OFFICE VISIT (OUTPATIENT)
Dept: PRIMARY CARE | Facility: CLINIC | Age: 69
End: 2024-05-17
Payer: MEDICARE

## 2024-05-17 VITALS
HEIGHT: 70 IN | HEART RATE: 55 BPM | BODY MASS INDEX: 29.49 KG/M2 | DIASTOLIC BLOOD PRESSURE: 82 MMHG | WEIGHT: 206 LBS | TEMPERATURE: 97.5 F | SYSTOLIC BLOOD PRESSURE: 140 MMHG | OXYGEN SATURATION: 95 %

## 2024-05-17 DIAGNOSIS — M76.31 ILIOTIBIAL BAND SYNDROME OF RIGHT SIDE: ICD-10-CM

## 2024-05-17 DIAGNOSIS — M70.61 TROCHANTERIC BURSITIS OF RIGHT HIP: Primary | ICD-10-CM

## 2024-05-17 DIAGNOSIS — M62.89 HAMSTRING TIGHTNESS OF RIGHT LOWER EXTREMITY: ICD-10-CM

## 2024-05-17 PROCEDURE — 1036F TOBACCO NON-USER: CPT | Performed by: FAMILY MEDICINE

## 2024-05-17 PROCEDURE — 99214 OFFICE O/P EST MOD 30 MIN: CPT | Performed by: FAMILY MEDICINE

## 2024-05-17 PROCEDURE — 3079F DIAST BP 80-89 MM HG: CPT | Performed by: FAMILY MEDICINE

## 2024-05-17 PROCEDURE — 3077F SYST BP >= 140 MM HG: CPT | Performed by: FAMILY MEDICINE

## 2024-05-17 PROCEDURE — 1159F MED LIST DOCD IN RCRD: CPT | Performed by: FAMILY MEDICINE

## 2024-05-17 NOTE — PROGRESS NOTES
"Subjective   Patient ID: Osmar Pond is a 68 y.o. male who presents for Right Hip and Leg Pain.    HPI Patient states over the past year his right hip and right knee have been painful to the point of having difficulty standing or walking for extended periods of time.  States he had xrays 1-2 years ago and was told he has arthritis and that he should wait until he can no longer handle the pain and he is at that point over the past few months.  Uses Tylenol Arthritis for pain control but it is only minimally effective.      Review of Systems    Objective   /82 (BP Location: Left arm, Patient Position: Sitting)   Pulse 55   Temp 36.4 °C (97.5 °F) (Oral)   Ht 1.778 m (5' 10\")   Wt 93.4 kg (206 lb)   SpO2 95% Comment: RA  BMI 29.56 kg/m²     Physical Exam  Constitutional:       Appearance: Normal appearance.   Eyes:      Conjunctiva/sclera: Conjunctivae normal.   Cardiovascular:      Rate and Rhythm: Normal rate and regular rhythm.   Pulmonary:      Effort: Pulmonary effort is normal.      Breath sounds: Normal breath sounds.   Abdominal:      Palpations: Abdomen is soft.   Musculoskeletal:         General: Normal range of motion.      Comments: Localized TTP over the right trochanteric bursa  Very tight IT band and hamstring on the right   Skin:     General: Skin is warm and dry.   Neurological:      Mental Status: He is alert.   Psychiatric:         Mood and Affect: Mood normal.         Assessment/Plan   Diagnoses and all orders for this visit:  Trochanteric bursitis of right hip  Iliotibial band syndrome of right side  Hamstring tightness of right lower extremity        -   Reviewed previous Xrays of the hip -- mild OA, otherwise unremarkable.         - Reviewed and demonstrated IT band and hamstring stretches.         - Decrease walking distance by 50% for the week and add back 10% each week as tolerated.         - Ice over the trochanteric bursa every day-BID x 15 min        - RTC if no improvement " 4-6 weeks

## 2024-05-22 ENCOUNTER — LAB (OUTPATIENT)
Dept: LAB | Facility: LAB | Age: 69
End: 2024-05-22
Payer: MEDICARE

## 2024-05-22 ENCOUNTER — OFFICE VISIT (OUTPATIENT)
Dept: NEUROLOGY | Facility: HOSPITAL | Age: 69
End: 2024-05-22
Payer: MEDICARE

## 2024-05-22 VITALS
DIASTOLIC BLOOD PRESSURE: 79 MMHG | WEIGHT: 203.3 LBS | BODY MASS INDEX: 29.17 KG/M2 | SYSTOLIC BLOOD PRESSURE: 133 MMHG | HEART RATE: 67 BPM

## 2024-05-22 DIAGNOSIS — R41.3 MEMORY LOSS: ICD-10-CM

## 2024-05-22 DIAGNOSIS — G31.84 MCI (MILD COGNITIVE IMPAIRMENT) WITH MEMORY LOSS: Primary | ICD-10-CM

## 2024-05-22 DIAGNOSIS — G31.84 MCI (MILD COGNITIVE IMPAIRMENT) WITH MEMORY LOSS: ICD-10-CM

## 2024-05-22 DIAGNOSIS — H90.3 ASYMMETRICAL SENSORINEURAL HEARING LOSS: ICD-10-CM

## 2024-05-22 LAB
TSH SERPL-ACNC: 1.46 MIU/L (ref 0.44–3.98)
VIT B12 SERPL-MCNC: 2323 PG/ML (ref 211–911)

## 2024-05-22 PROCEDURE — 99215 OFFICE O/P EST HI 40 MIN: CPT | Performed by: PSYCHIATRY & NEUROLOGY

## 2024-05-22 PROCEDURE — 84443 ASSAY THYROID STIM HORMONE: CPT

## 2024-05-22 PROCEDURE — 82607 VITAMIN B-12: CPT

## 2024-05-22 PROCEDURE — 3078F DIAST BP <80 MM HG: CPT | Performed by: PSYCHIATRY & NEUROLOGY

## 2024-05-22 PROCEDURE — 1159F MED LIST DOCD IN RCRD: CPT | Performed by: PSYCHIATRY & NEUROLOGY

## 2024-05-22 PROCEDURE — 1036F TOBACCO NON-USER: CPT | Performed by: PSYCHIATRY & NEUROLOGY

## 2024-05-22 PROCEDURE — 1160F RVW MEDS BY RX/DR IN RCRD: CPT | Performed by: PSYCHIATRY & NEUROLOGY

## 2024-05-22 PROCEDURE — 3075F SYST BP GE 130 - 139MM HG: CPT | Performed by: PSYCHIATRY & NEUROLOGY

## 2024-05-22 PROCEDURE — 1125F AMNT PAIN NOTED PAIN PRSNT: CPT | Performed by: PSYCHIATRY & NEUROLOGY

## 2024-05-22 PROCEDURE — 36415 COLL VENOUS BLD VENIPUNCTURE: CPT

## 2024-05-22 RX ORDER — DEXTROMETHORPHAN HYDROBROMIDE, GUAIFENESIN 5; 100 MG/5ML; MG/5ML
650 LIQUID ORAL EVERY 8 HOURS PRN
COMMUNITY

## 2024-05-22 ASSESSMENT — ENCOUNTER SYMPTOMS
LOSS OF SENSATION IN FEET: 0
DEPRESSION: 0
OCCASIONAL FEELINGS OF UNSTEADINESS: 0

## 2024-05-22 ASSESSMENT — PAIN SCALES - GENERAL: PAINLEVEL: 8

## 2024-05-22 NOTE — LETTER
"May 22, 2024     Julio Landaverde DO  [address] [facility] [address]  [facility] [address]    Patient: Osmar Pond   YOB: 1955   Date of Visit: 5/22/2024       Dear Dr. Julio Landaverde DO:    Thank you for referring Osmar Pond to me for evaluation. Below are my notes for this consultation.  If you have questions, please do not hesitate to call me. I look forward to following your patient along with you.       Sincerely,     Meir Mendez MD      CC: No Recipients  ______________________________________________________________________________________    In-clinic visit    Visit type: provider referral: Dr Landaverde    PCP: Julio Landaverde DO.    Subjective    Osmar Pond is a 68 y.o. year old right handed male who presents with Memory Loss (Ref by Dr Landaverde) and Not taking Memantine or Donepezil.    Patient is accompanied by spouse.     HPI    Referred by PCP \"ASAP\" to neurology for memory problems x2.5 years. Was started on memantine by PCP but insurance wouldn't cover. Was started on donepezil instead.     Hearing poor.    S/p CABG ~3 years ago. Since then memory has been bad. Short term memory is troublesome. Lives with wife. Driving. Not getting lost. Denies problems driving. Not leaving the faucet on or stove on. Bills on autopay. No hallucinations.    Tried donepezil for a few days, \"head felt funny\". ? Dizzy. He stopped it.    No sz. No stroke. Balance ok. No falls.     Hearing bad for 2 years. Says hearing aids are a joke--not happy with them.    No B12/TSH. Not diabetic.    5/2022 MRI IAC wwo done ok, there was some vol loss noted. Nothing significant happened medically since then.    SLUMS 10/30 on 5/22/24    Denies anxiety/depression. Was a .    Mother had dementia symptoms at age 60s, passed away at 66 yo.    Former smoker. Drank alcohol, last was 33 years ago.     Pt/spouse did not disclose but found on review of records after visit, pt had seen Dr Perez in [facility] in " 2022 for same problem. Was referred for neuropsych testing-->dx MCI with memory loss. Was advised follow-up with neurologist, but appears to not have followed up.    Review of Systems   Constitutional:  Negative for appetite change, chills and fever.   HENT:  Negative for ear pain and nosebleeds.    Eyes:  Negative for discharge and itching.   Respiratory:  Negative for choking and chest tightness.    Cardiovascular:  Negative for chest pain and palpitations.   Gastrointestinal:  Negative for abdominal distention, abdominal pain and nausea.   Endocrine: Negative for cold intolerance and heat intolerance.   Genitourinary:  Negative for difficulty urinating and dysuria.   Musculoskeletal:  Negative for gait problem and myalgias.   Neurological:  Negative for dizziness, seizures and numbness.     Patient Active Problem List   Diagnosis   • Abnormal cardiovascular function study   • Bradycardia by electrocardiography   • Abnormal EKG   • Acute sinusitis   • Allergies   • Anxiety   • Arthritis   • Asymmetrical sensorineural hearing loss   • Sensorineural hearing loss, bilateral   • Benign paroxysmal positional vertigo   • Tinnitus of both ears   • Multiple vessel coronary artery disease   • Daytime somnolence   • Disturbance in sleep behavior   • Leukocytosis   • Hyperlipidemia   • Hypertriglyceridemia   • Hypertension   • Iliotibial band syndrome of right side   • Amnesia   • Obstructive sleep apnea syndrome   • PVC (premature ventricular contraction)   • S/P CABG x 4   • Seasonal allergic rhinitis   • Increased glucose level   • Syncope   • Trochanteric bursitis of right hip   • Supraventricular tachycardia, paroxysmal (CMS-HCC)   • Ventricular tachycardia (paroxysmal) (Multi)   • Vertigo of central origin   • Impaired cognition   • Overweight with body mass index (BMI) of 28 to 28.9 in adult   • Dizziness   • Malignant melanoma of upper arm (Multi)   • Greater trochanteric bursitis   • Benign prostatic hyperplasia  with urinary obstruction   • Disorder of diaphragm   • Disorder of paranasal sinus   • Left lower quadrant abdominal pain   • Nasal discharge   • Rosacea   • Sensation of chest tightness       History reviewed. No pertinent past medical history.  Past Surgical History:   Procedure Laterality Date   • OTHER SURGICAL HISTORY  11/05/2018    Hernia repair   • OTHER SURGICAL HISTORY  12/09/2021    Coronary artery bypass graft     Social History     Tobacco Use   • Smoking status: Former     Types: Cigarettes   • Smokeless tobacco: Never   Substance Use Topics   • Alcohol use: Not Currently     family history includes Dementia in his mother; No Known Problems in his father.    No Known Allergies    Current Outpatient Medications:   •  acetaminophen (Tylenol 8 HOUR) 650 mg ER tablet, Take 1 tablet (650 mg) by mouth every 8 hours if needed for mild pain (1 - 3). Do not crush, chew, or split.  Takes 2 at hs prn, Disp: , Rfl:   •  aspirin 81 mg EC tablet, Take 1 tablet (81 mg) by mouth once daily. As directed, Disp: , Rfl:   •  atorvastatin (Lipitor) 40 mg tablet, Take 1 tablet (40 mg) by mouth once daily., Disp: 90 tablet, Rfl: 3  •  multivitamin (Daily Multi-Vitamin) tablet, Take by mouth once daily., Disp: , Rfl:   •  multivitamin-children's (Cerovite, Jr) chewable tablet, Chew 1 tablet once daily. Lion's natalia capsule 2/qd, Disp: , Rfl:     Objective    /79   Pulse 67   Wt 92.2 kg (203 lb 4.8 oz)   BMI 29.17 kg/m²     SLUMS 10/30 on 5/22/24    Awake, alert, oriented x3, in no distress  Well-nourished, ambulatory independently  No leg edema    Mental status exam as above, conversant   Fair fund of knowledge  Recent/remote memory fair  Fair attention span  Pupils round reactive to light, 3-4 mm, (-) RAPD   Fundoscopic examination was attempted but fundus was not visualized bilaterally   Full EOMs intact, no nystagmus, no ptosis   V1 to V3 sensation is intact   No facial droop   Hearing grossly intact but  decreased  No dysarthria  Good shoulder shrug bilaterally   Tongue is midline     Motor strength is 5/5 on all extremities, tone/bulk normal   Reflexes 1+ on all 4 extremities, downgoing toes bilaterally   Sensation is intact to light touch, vibration on all 4 extremities   Finger to nose test intact bilaterally   Negative Romberg sign   Normal gait       Lab Results   Component Value Date    WBC 10.3 01/02/2024    RBC 4.70 01/02/2024    HGB 14.9 01/02/2024    HCT 44.1 01/02/2024     01/02/2024     11/15/2023    K 4.7 11/15/2023     11/15/2023    BUN 15 11/15/2023    CREATININE 0.77 11/15/2023    EGFR >90 11/15/2023    CALCIUM 9.9 11/15/2023    ALKPHOS 68 11/15/2023    AST 32 11/15/2023    ALT 29 11/15/2023    MG 2.11 11/28/2021    LDLCALC 70 11/15/2023    CHOL 159 11/15/2023    HDL 71.6 11/15/2023    TRIG 85 11/15/2023    TSH 1.44 09/24/2020        MR BRAIN W AND WO IV CONTRAST 10/08/2020    Narrative  MRN: [id number]  Patient Name: MICHAEL BLOUNT    STUDY:  MRI BRAIN W/WO CONTRAST;  10/8/2020 10:28 am    INDICATION:  syncopal episode.    COMPARISON:  None.    ACCESSION NUMBER(S):  [id number]    ORDERING CLINICIAN:  ERI GODFREY    TECHNIQUE:  The brain was studied in the sagittal axial and coronal planes  utilizing FLAIR, T1 and T2 weighted images    FINDINGS:  There is slight prominence of the cortical sulci and sylvian  fissures. There is mild ventricular dilatation.  There are a few  scattered foci of abnormal signal within the basal ganglia and  subcortical white matter bilaterally.  These are compatible with  minimal small vessel ischemic changes.  These nonspecific findings  could also be produced by a demyelinating or post inflammatory  process.  The visualized skull base paranasal sinuses and orbital  structures are unremarkable. Diffusion weighted images and associated  ADC maps of the brain were unremarkable.  There is no evidence of  diffusion restriction to suggest the presence of  acute infarction.  Gradient echo T2 weighted images fail to demonstrate hemosiderin  deposition or other evidence of hemorrhage. Following intravenous  injection of ,there is no abnormal enhancement. There is normal  contrast opacification of the dural venous sinuses.    IMPRESSION  * There is no evidence of mass, cerebral infarction or hemorrhage.    THIS EXAMINATION WAS INTERPRETED AT [facility]      Assessment/Plan    MCI with memory loss  Hearing loss  SLUMS 10/30 on 5/22/24  Pt states only problem is short term memory  Discussed  Cannot exclude contribution of hearing problem to memory issue  2022 MRI IAC wwo with mild to mod vol loss  No B12/TSH--pt/spouse states no reason to check for syphilis/HIV    Pt prescribed donepezil by PCP--had perceived SE and stopped    We discussed about available memory pills and memory IV medication (and possible referral to memory clinic if interested in latter). Realistic expectations discussed. Pt/spouse wants to retry donepezil. States they still have it at home--? Dose.    Plans:  Check B12 level, TSH  Try donepezil 5mg at bedtime x1 mo, then if no SE, plan go up to 10mg at bedtime  Discussed about possible IV medication and referral to memory clinic  Cannot exclude contribution of hearing problem to memory issues    Rtc 3 mo Jolie Schmitt CNP    All questions were answered.  Pt knows how to contact my office in case pt has any questions or concerns.    Meir Mendez MD

## 2024-05-22 NOTE — PATIENT INSTRUCTIONS
Check B12 level, TSH  Try donepezil 5mg at bedtime x1 mo, then if no SE, go up to 10mg at bedtime  Discussed about possible IV medication and referral to memory clinic  Cannot exclude contribution of hearing problem to memory issues    Rtc 3 mo Jolie Schmitt CNP

## 2024-05-22 NOTE — PROGRESS NOTES
"In-clinic visit    Visit type: provider referral: Dr Landaverde    PCP: Julio Landaverde DO.    Subjective     Osmar Pond is a 68 y.o. year old right handed male who presents with Memory Loss (Ref by Dr Landaverde) and Not taking Memantine or Donepezil.    Patient is accompanied by spouse.     HPI    Referred by PCP \"ASAP\" to neurology for memory problems x2.5 years. Was started on memantine by PCP but insurance wouldn't cover. Was started on donepezil instead.     Hearing poor.    S/p CABG ~3 years ago. Since then memory has been bad. Short term memory is troublesome. Lives with wife. Driving. Not getting lost. Denies problems driving. Not leaving the faucet on or stove on. Bills on autopay. No hallucinations.    Tried donepezil for a few days, \"head felt funny\". ? Dizzy. He stopped it.    No sz. No stroke. Balance ok. No falls.     Hearing bad for 2 years. Says hearing aids are a joke--not happy with them.    No B12/TSH. Not diabetic.    5/2022 MRI IAC wwo done ok, there was some vol loss noted. Nothing significant happened medically since then.    SLUMS 10/30 on 5/22/24    Denies anxiety/depression. Was a .    Mother had dementia symptoms at age 60s, passed away at 68 yo.    Former smoker. Drank alcohol, last was 33 years ago.     Pt/spouse did not disclose but found on review of records after visit, pt had seen Dr Perez in [facility] in 2022 for same problem. Was referred for neuropsych testing-->dx MCI with memory loss. Was advised follow-up with neurologist, but appears to not have followed up.    Review of Systems   Constitutional:  Negative for appetite change, chills and fever.   HENT:  Negative for ear pain and nosebleeds.    Eyes:  Negative for discharge and itching.   Respiratory:  Negative for choking and chest tightness.    Cardiovascular:  Negative for chest pain and palpitations.   Gastrointestinal:  Negative for abdominal distention, abdominal pain and nausea.   Endocrine: Negative for cold " intolerance and heat intolerance.   Genitourinary:  Negative for difficulty urinating and dysuria.   Musculoskeletal:  Negative for gait problem and myalgias.   Neurological:  Negative for dizziness, seizures and numbness.     Patient Active Problem List   Diagnosis    Abnormal cardiovascular function study    Bradycardia by electrocardiography    Abnormal EKG    Acute sinusitis    Allergies    Anxiety    Arthritis    Asymmetrical sensorineural hearing loss    Sensorineural hearing loss, bilateral    Benign paroxysmal positional vertigo    Tinnitus of both ears    Multiple vessel coronary artery disease    Daytime somnolence    Disturbance in sleep behavior    Leukocytosis    Hyperlipidemia    Hypertriglyceridemia    Hypertension    Iliotibial band syndrome of right side    Amnesia    Obstructive sleep apnea syndrome    PVC (premature ventricular contraction)    S/P CABG x 4    Seasonal allergic rhinitis    Increased glucose level    Syncope    Trochanteric bursitis of right hip    Supraventricular tachycardia, paroxysmal (CMS-HCC)    Ventricular tachycardia (paroxysmal) (Multi)    Vertigo of central origin    Impaired cognition    Overweight with body mass index (BMI) of 28 to 28.9 in adult    Dizziness    Malignant melanoma of upper arm (Multi)    Greater trochanteric bursitis    Benign prostatic hyperplasia with urinary obstruction    Disorder of diaphragm    Disorder of paranasal sinus    Left lower quadrant abdominal pain    Nasal discharge    Rosacea    Sensation of chest tightness       History reviewed. No pertinent past medical history.  Past Surgical History:   Procedure Laterality Date    OTHER SURGICAL HISTORY  11/05/2018    Hernia repair    OTHER SURGICAL HISTORY  12/09/2021    Coronary artery bypass graft     Social History     Tobacco Use    Smoking status: Former     Types: Cigarettes    Smokeless tobacco: Never   Substance Use Topics    Alcohol use: Not Currently     family history includes Dementia  in his mother; No Known Problems in his father.    No Known Allergies    Current Outpatient Medications:     acetaminophen (Tylenol 8 HOUR) 650 mg ER tablet, Take 1 tablet (650 mg) by mouth every 8 hours if needed for mild pain (1 - 3). Do not crush, chew, or split.  Takes 2 at hs prn, Disp: , Rfl:     aspirin 81 mg EC tablet, Take 1 tablet (81 mg) by mouth once daily. As directed, Disp: , Rfl:     atorvastatin (Lipitor) 40 mg tablet, Take 1 tablet (40 mg) by mouth once daily., Disp: 90 tablet, Rfl: 3    multivitamin (Daily Multi-Vitamin) tablet, Take by mouth once daily., Disp: , Rfl:     multivitamin-children's (Cerovite, Jr) chewable tablet, Chew 1 tablet once daily. Lion's natalia capsule 2/qd, Disp: , Rfl:     Objective     /79   Pulse 67   Wt 92.2 kg (203 lb 4.8 oz)   BMI 29.17 kg/m²     SLUMS 10/30 on 5/22/24    Awake, alert, oriented x3, in no distress  Well-nourished, ambulatory independently  No leg edema    Mental status exam as above, conversant   Fair fund of knowledge  Recent/remote memory fair  Fair attention span  Pupils round reactive to light, 3-4 mm, (-) RAPD   Fundoscopic examination was attempted but fundus was not visualized bilaterally   Full EOMs intact, no nystagmus, no ptosis   V1 to V3 sensation is intact   No facial droop   Hearing grossly intact but decreased  No dysarthria  Good shoulder shrug bilaterally   Tongue is midline     Motor strength is 5/5 on all extremities, tone/bulk normal   Reflexes 1+ on all 4 extremities, downgoing toes bilaterally   Sensation is intact to light touch, vibration on all 4 extremities   Finger to nose test intact bilaterally   Negative Romberg sign   Normal gait       Lab Results   Component Value Date    WBC 10.3 01/02/2024    RBC 4.70 01/02/2024    HGB 14.9 01/02/2024    HCT 44.1 01/02/2024     01/02/2024     11/15/2023    K 4.7 11/15/2023     11/15/2023    BUN 15 11/15/2023    CREATININE 0.77 11/15/2023    EGFR >90 11/15/2023     CALCIUM 9.9 11/15/2023    ALKPHOS 68 11/15/2023    AST 32 11/15/2023    ALT 29 11/15/2023    MG 2.11 11/28/2021    LDLCALC 70 11/15/2023    CHOL 159 11/15/2023    HDL 71.6 11/15/2023    TRIG 85 11/15/2023    TSH 1.44 09/24/2020        MR BRAIN W AND WO IV CONTRAST 10/08/2020    Narrative  MRN: [id number]  Patient Name: MICHAEL BLOUNT    STUDY:  MRI BRAIN W/WO CONTRAST;  10/8/2020 10:28 am    INDICATION:  syncopal episode.    COMPARISON:  None.    ACCESSION NUMBER(S):  [id number]    ORDERING CLINICIAN:  ERI GODFREY    TECHNIQUE:  The brain was studied in the sagittal axial and coronal planes  utilizing FLAIR, T1 and T2 weighted images    FINDINGS:  There is slight prominence of the cortical sulci and sylvian  fissures. There is mild ventricular dilatation.  There are a few  scattered foci of abnormal signal within the basal ganglia and  subcortical white matter bilaterally.  These are compatible with  minimal small vessel ischemic changes.  These nonspecific findings  could also be produced by a demyelinating or post inflammatory  process.  The visualized skull base paranasal sinuses and orbital  structures are unremarkable. Diffusion weighted images and associated  ADC maps of the brain were unremarkable.  There is no evidence of  diffusion restriction to suggest the presence of acute infarction.  Gradient echo T2 weighted images fail to demonstrate hemosiderin  deposition or other evidence of hemorrhage. Following intravenous  injection of ,there is no abnormal enhancement. There is normal  contrast opacification of the dural venous sinuses.    IMPRESSION  * There is no evidence of mass, cerebral infarction or hemorrhage.    [place]AM[place] [facility]      Assessment/Plan     MCI with memory loss  Hearing loss  SLUMS 10/30 on 5/22/24  Pt states only problem is short term memory  Discussed  Cannot exclude contribution of hearing problem to memory issue  2022 MRI IAC wwo with mild to mod vol loss  No  B12/TSH--pt/spouse states no reason to check for syphilis/HIV    Pt prescribed donepezil by PCP--had perceived SE and stopped    We discussed about available memory pills and memory IV medication (and possible referral to memory clinic if interested in latter). Realistic expectations discussed. Pt/spouse wants to retry donepezil. States they still have it at home--? Dose.    Plans:  Check B12 level, TSH  Try donepezil 5mg at bedtime x1 mo, then if no SE, plan go up to 10mg at bedtime  Discussed about possible IV medication and referral to memory clinic  Cannot exclude contribution of hearing problem to memory issues    Rtc 3 mo Jolie Schmitt CNP    All questions were answered.  Pt knows how to contact my office in case pt has any questions or concerns.    Meir Mendez MD        none

## 2024-05-24 ENCOUNTER — TELEPHONE (OUTPATIENT)
Dept: NEUROLOGY | Facility: HOSPITAL | Age: 69
End: 2024-05-24
Payer: MEDICARE

## 2024-05-24 NOTE — TELEPHONE ENCOUNTER
VMML with update that pt is safe having a high B12 level.  VMLCB to further discuss why and about supplement dose.

## 2024-05-24 NOTE — TELEPHONE ENCOUNTER
Wife called in with concerns about B 12 level being so high.  She read your portal message but was still concerned and wanted to double check.    Pt had been taking 5000 mcg B12/day (self medicating for fatigue) along with a multivitamin that contains 25 mcg/day.  He has stopped taking these.    Thank you.

## 2024-05-28 ENCOUNTER — OFFICE VISIT (OUTPATIENT)
Dept: PRIMARY CARE | Facility: CLINIC | Age: 69
End: 2024-05-28
Payer: MEDICARE

## 2024-05-28 VITALS
SYSTOLIC BLOOD PRESSURE: 126 MMHG | BODY MASS INDEX: 29.35 KG/M2 | DIASTOLIC BLOOD PRESSURE: 72 MMHG | WEIGHT: 205 LBS | HEART RATE: 66 BPM | OXYGEN SATURATION: 97 % | HEIGHT: 70 IN

## 2024-05-28 DIAGNOSIS — M70.61 TROCHANTERIC BURSITIS, RIGHT HIP: Primary | ICD-10-CM

## 2024-05-28 DIAGNOSIS — M76.31 ILIOTIBIAL BAND SYNDROME OF RIGHT SIDE: ICD-10-CM

## 2024-05-28 PROCEDURE — 1160F RVW MEDS BY RX/DR IN RCRD: CPT | Performed by: FAMILY MEDICINE

## 2024-05-28 PROCEDURE — 96372 THER/PROPH/DIAG INJ SC/IM: CPT | Performed by: FAMILY MEDICINE

## 2024-05-28 PROCEDURE — 1159F MED LIST DOCD IN RCRD: CPT | Performed by: FAMILY MEDICINE

## 2024-05-28 PROCEDURE — 3074F SYST BP LT 130 MM HG: CPT | Performed by: FAMILY MEDICINE

## 2024-05-28 PROCEDURE — 3078F DIAST BP <80 MM HG: CPT | Performed by: FAMILY MEDICINE

## 2024-05-28 PROCEDURE — 1036F TOBACCO NON-USER: CPT | Performed by: FAMILY MEDICINE

## 2024-05-28 PROCEDURE — 99214 OFFICE O/P EST MOD 30 MIN: CPT | Performed by: FAMILY MEDICINE

## 2024-05-28 RX ORDER — TRIAMCINOLONE ACETONIDE 40 MG/ML
40 INJECTION, SUSPENSION INTRA-ARTICULAR; INTRAMUSCULAR ONCE
Status: COMPLETED | OUTPATIENT
Start: 2024-05-28 | End: 2024-05-28

## 2024-05-28 RX ADMIN — TRIAMCINOLONE ACETONIDE 40 MG: 40 INJECTION, SUSPENSION INTRA-ARTICULAR; INTRAMUSCULAR at 14:47

## 2024-05-28 NOTE — PROGRESS NOTES
"Subjective   Patient ID: Osmar Pond is a 68 y.o. male who presents for Follow-up (Trochanteric bursitis of right hip, Iliotibial band syndrome of right side ).    HPI Pain with difficulty walking. Has tried stretching with no improvement.     Review of Systems   All other systems reviewed and are negative.      Objective   /72   Pulse 66   Ht 1.778 m (5' 10\")   Wt 93 kg (205 lb)   SpO2 97%   BMI 29.41 kg/m²     Physical Exam  Constitutional:       Appearance: Normal appearance.   Eyes:      Conjunctiva/sclera: Conjunctivae normal.   Cardiovascular:      Rate and Rhythm: Normal rate and regular rhythm.   Pulmonary:      Effort: Pulmonary effort is normal.      Breath sounds: Normal breath sounds.   Abdominal:      Palpations: Abdomen is soft.   Musculoskeletal:         General: Normal range of motion.   Skin:     General: Skin is warm and dry.   Neurological:      Mental Status: He is alert.   Psychiatric:         Mood and Affect: Mood normal.         Assessment/Plan   Diagnoses and all orders for this visit:  Trochanteric bursitis, right hip  -     triamcinolone acetonide (Kenalog-40) injection 40 mg  -     Referral to Physical Therapy; Future  Iliotibial band syndrome of right side       "

## 2024-05-28 NOTE — TELEPHONE ENCOUNTER
Pt called back for update.  Connection bad, so I called her back.  Verbalized understanding and satisfaction.

## 2024-06-06 PROBLEM — J31.0 CHRONIC RHINITIS: Status: ACTIVE | Noted: 2024-06-06

## 2024-06-07 ENCOUNTER — OFFICE VISIT (OUTPATIENT)
Dept: CARDIOLOGY | Facility: CLINIC | Age: 69
End: 2024-06-07
Payer: MEDICARE

## 2024-06-07 VITALS
HEIGHT: 70 IN | HEART RATE: 70 BPM | DIASTOLIC BLOOD PRESSURE: 64 MMHG | SYSTOLIC BLOOD PRESSURE: 118 MMHG | BODY MASS INDEX: 29.35 KG/M2 | WEIGHT: 205 LBS

## 2024-06-07 DIAGNOSIS — I49.3 PVC (PREMATURE VENTRICULAR CONTRACTION): ICD-10-CM

## 2024-06-07 DIAGNOSIS — I10 HYPERTENSION, UNSPECIFIED TYPE: ICD-10-CM

## 2024-06-07 DIAGNOSIS — I25.10 CAD, MULTIPLE VESSEL: ICD-10-CM

## 2024-06-07 DIAGNOSIS — Z95.1 S/P CABG X 4: ICD-10-CM

## 2024-06-07 DIAGNOSIS — E78.1 HYPERTRIGLYCERIDEMIA: ICD-10-CM

## 2024-06-07 DIAGNOSIS — E78.5 HYPERLIPIDEMIA, UNSPECIFIED HYPERLIPIDEMIA TYPE: ICD-10-CM

## 2024-06-07 DIAGNOSIS — I25.10 MULTIPLE VESSEL CORONARY ARTERY DISEASE: ICD-10-CM

## 2024-06-07 PROCEDURE — 1036F TOBACCO NON-USER: CPT | Performed by: INTERNAL MEDICINE

## 2024-06-07 PROCEDURE — 99214 OFFICE O/P EST MOD 30 MIN: CPT | Performed by: INTERNAL MEDICINE

## 2024-06-07 PROCEDURE — 3074F SYST BP LT 130 MM HG: CPT | Performed by: INTERNAL MEDICINE

## 2024-06-07 PROCEDURE — 3078F DIAST BP <80 MM HG: CPT | Performed by: INTERNAL MEDICINE

## 2024-06-07 PROCEDURE — 1159F MED LIST DOCD IN RCRD: CPT | Performed by: INTERNAL MEDICINE

## 2024-06-07 RX ORDER — DONEPEZIL HYDROCHLORIDE 5 MG/1
5 TABLET, FILM COATED ORAL NIGHTLY
COMMUNITY

## 2024-06-07 NOTE — PROGRESS NOTES
"Chief Complaint:   No chief complaint on file.     History Of Present Illness:    Osmar Pond is a 69 y.o. male presenting for 6 month follow up  H/o severe multivessel CAD s/p CABG x4 (L-LAD, S-AM and PDA, Radial-OM1), pSVT, pVT, HTN, syncope, moderate RIOS, vertigo, ? Dementia.     Continues to feel \"tired,\" but is able to walk and mow the lawn.  Walks five times a week.  No chest pain, LEON, LE edema.      CV testing reviewed:     5/2024  TSH 1.46  11/2023 lipid labs  chol 159, HDL 71.6, LDL 70, trig 85     12/2023  TTE   EF 40-45% stage I DD, mildly reduced RV fxn, trace to mild mitral regurg, trace tricuspid regurg     2/2022 Holter monitor - average HR was 63 bpm. No afib or pauses. 64 episodes of pSVT (31 beats longest) and 11 episodes of NSVT (4 beats longest). 3.2% PVC burden. Patient events were associated with SB/SR with HR's 51 - 100 bpm.     Last Recorded Vitals:  There were no vitals filed for this visit.    Past Medical History:  He has no past medical history on file.    Past Surgical History:  He has a past surgical history that includes Other surgical history (11/05/2018) and Other surgical history (12/09/2021).      Social History:  He reports that he has quit smoking. His smoking use included cigarettes. He has never used smokeless tobacco. He reports that he does not currently use alcohol. He reports that he does not use drugs.    Family History:  Family History   Problem Relation Name Age of Onset    Dementia Mother      No Known Problems Father          Allergies:  Patient has no known allergies.    Outpatient Medications:  Current Outpatient Medications   Medication Instructions    acetaminophen (TYLENOL 8 HOUR) 650 mg, oral, Every 8 hours PRN, Do not crush, chew, or split.  Takes 2 at hs prn    aspirin 81 mg EC tablet 1 tablet, oral, Daily, As directed    atorvastatin (LIPITOR) 40 mg, oral, Daily    multivitamin (Daily Multi-Vitamin) tablet oral, Daily RT    multivitamin-children's " "(Aris, ) chewable tablet 1 tablet, oral, Daily, Lion's natalia capsule 2/qd       Physical Exam:  Physical Exam  HENT:      Head: Normocephalic.      Nose: Nose normal.      Mouth/Throat:      Mouth: Mucous membranes are moist.   Cardiovascular:      Rate and Rhythm: Normal rate and regular rhythm.      Comments: 1/6 systolic murmur LLSB  No carotid bruits  Premature beats heard,  Pulmonary:      Effort: Pulmonary effort is normal.      Breath sounds: Normal breath sounds.   Abdominal:      Palpations: Abdomen is soft.   Musculoskeletal:         General: Normal range of motion.      Cervical back: Normal range of motion.   Skin:     General: Skin is warm and dry.   Neurological:      General: No focal deficit present.      Mental Status: He is alert.   Psychiatric:         Mood and Affect: Mood normal.           Last Labs:  CBC -  Lab Results   Component Value Date    WBC 10.3 01/02/2024    HGB 14.9 01/02/2024    HCT 44.1 01/02/2024    MCV 94 01/02/2024     01/02/2024       CMP -  Lab Results   Component Value Date    CALCIUM 9.9 11/15/2023    PHOS 3.2 11/27/2021    PROT 6.4 11/15/2023    ALBUMIN 4.3 11/15/2023    AST 32 11/15/2023    ALT 29 11/15/2023    ALKPHOS 68 11/15/2023    BILITOT 0.6 11/15/2023       LIPID PANEL -   Lab Results   Component Value Date    CHOL 159 11/15/2023    TRIG 85 11/15/2023    HDL 71.6 11/15/2023    CHHDL 2.2 11/15/2023    LDLF 69 11/08/2022    VLDL 17 11/15/2023    NHDL 87 11/15/2023       RENAL FUNCTION PANEL -   Lab Results   Component Value Date    GLUCOSE 91 11/15/2023     11/15/2023    K 4.7 11/15/2023     11/15/2023    CO2 30 11/15/2023    ANIONGAP 11 11/15/2023    BUN 15 11/15/2023    CREATININE 0.77 11/15/2023    GFRMALE 90 11/08/2022    CALCIUM 9.9 11/15/2023    PHOS 3.2 11/27/2021    ALBUMIN 4.3 11/15/2023        No results found for: \"BNP\", \"HGBA1C\"      Assessment/Plan         Cardiac and Vasculature      Bradycardia by electrocardiography R00.1       " H/o bradycardia, did not tolerate BB due to this and fatigue.  HR's today mostly 60-70's on pulse oximeter.  Denies any presyncope/syncope.      -serial monitoring for symptomatic bradycardia  -no current indication for PPM           Multiple vessel coronary artery disease - Primary I25.10       S/p CABG.  No chest pain/pressure.       -ASA 81mg daily  -atovastatin 40mg daily  -he has been counseled on the lifethreatening interaction between nitrates and tadalafil, and tells me he is amenable to discontinuing tadalafil           Relevant Orders     Follow Up In Cardiology     Hyperlipidemia E78.5       Lipids 11/2023 at goal.     -atorvastatin 40mg daily           Relevant Orders     Follow Up In Cardiology     Hypertriglyceridemia E78.1     Relevant Orders     Follow Up In Cardiology     Hypertension I10       BP well controlled, not requiring any antihypertensive meds           Relevant Orders     Follow Up In Cardiology     PVC (premature ventricular contraction) I49.3       H/o occ PVC's, no significant symptoms.   -unable to tolerate BB

## 2024-06-07 NOTE — PATIENT INSTRUCTIONS
Thanks for following up in office today.    1)  I am not changing any of your medications today     2)  Let me know if you have any new or concerning symptoms     3)  Please continue your cardiac medications as prescribed.    Follow up with me in a year.  If you have any questions, please call [phone] and choose option for Dr. Shipman's nurse Sindi Fitzgerald

## 2024-06-13 ENCOUNTER — TELEPHONE (OUTPATIENT)
Dept: NEUROLOGY | Facility: HOSPITAL | Age: 69
End: 2024-06-13
Payer: MEDICARE

## 2024-06-13 DIAGNOSIS — G31.84 MCI (MILD COGNITIVE IMPAIRMENT) WITH MEMORY LOSS: Primary | ICD-10-CM

## 2024-06-13 RX ORDER — DONEPEZIL HYDROCHLORIDE 5 MG/1
5 TABLET, FILM COATED ORAL NIGHTLY
Qty: 30 TABLET | Refills: 1 | Status: SHIPPED | OUTPATIENT
Start: 2024-06-13

## 2024-06-13 NOTE — TELEPHONE ENCOUNTER
RX REFILL  Donepezil (Aricept) 5 mg  1 tablet daily at bedtime   [facility]   Devante Mackenzie   Thank You

## 2024-06-17 ENCOUNTER — TELEPHONE (OUTPATIENT)
Dept: PRIMARY CARE | Facility: CLINIC | Age: 69
End: 2024-06-17
Payer: MEDICARE

## 2024-06-25 ENCOUNTER — APPOINTMENT (OUTPATIENT)
Dept: PRIMARY CARE | Facility: CLINIC | Age: 69
End: 2024-06-25
Payer: MEDICARE

## 2024-06-25 VITALS
SYSTOLIC BLOOD PRESSURE: 141 MMHG | HEART RATE: 50 BPM | OXYGEN SATURATION: 97 % | BODY MASS INDEX: 29.35 KG/M2 | WEIGHT: 205 LBS | DIASTOLIC BLOOD PRESSURE: 65 MMHG | HEIGHT: 70 IN

## 2024-06-25 DIAGNOSIS — M25.552 BILATERAL HIP PAIN: Primary | ICD-10-CM

## 2024-06-25 DIAGNOSIS — M25.551 BILATERAL HIP PAIN: Primary | ICD-10-CM

## 2024-06-25 PROCEDURE — 99214 OFFICE O/P EST MOD 30 MIN: CPT | Performed by: FAMILY MEDICINE

## 2024-06-25 PROCEDURE — 3077F SYST BP >= 140 MM HG: CPT | Performed by: FAMILY MEDICINE

## 2024-06-25 PROCEDURE — 1124F ACP DISCUSS-NO DSCNMKR DOCD: CPT | Performed by: FAMILY MEDICINE

## 2024-06-25 PROCEDURE — 3078F DIAST BP <80 MM HG: CPT | Performed by: FAMILY MEDICINE

## 2024-06-25 PROCEDURE — 1036F TOBACCO NON-USER: CPT | Performed by: FAMILY MEDICINE

## 2024-06-25 PROCEDURE — 1159F MED LIST DOCD IN RCRD: CPT | Performed by: FAMILY MEDICINE

## 2024-06-25 NOTE — PROGRESS NOTES
"Subjective   Patient ID: Osmar Pond is a 69 y.o. male who presents for Follow-up (Trochanteric Bursitis of right hip  ).    HPI Has completed three sessions of Physical Therapy with continued pain. Having difficulty walking. Continues taking Tylenol Arthritis. Patient would like to discuss possible referral to specialist or MRI.     Review of Systems   All other systems reviewed and are negative.      Objective   /65   Pulse 50   Ht 1.778 m (5' 10\")   Wt 93 kg (205 lb)   SpO2 97%   BMI 29.41 kg/m²     Physical Exam  Constitutional:       Appearance: Normal appearance.   Eyes:      Conjunctiva/sclera: Conjunctivae normal.   Cardiovascular:      Rate and Rhythm: Normal rate and regular rhythm.   Pulmonary:      Effort: Pulmonary effort is normal.      Breath sounds: Normal breath sounds.   Abdominal:      Palpations: Abdomen is soft.   Musculoskeletal:         General: Normal range of motion.   Skin:     General: Skin is warm and dry.   Neurological:      Mental Status: He is alert.   Psychiatric:         Mood and Affect: Mood normal.         Assessment/Plan Refer to [facility], order Xray  Diagnoses and all orders for this visit:  Bilateral hip pain        - Had Sx consistent with IT abnd syndrome a trochanteric bursitis of the right hip. Seen by PT and they recommended he see ortho after 5 visits. Pt states that his pain is now worse.  -     XR hips bilateral 5+ VW w pelvis when performed; Future  -     Referral to Orthopaedic Surgery; Future       "

## 2024-06-26 ENCOUNTER — HOSPITAL ENCOUNTER (OUTPATIENT)
Dept: RADIOLOGY | Facility: CLINIC | Age: 69
Discharge: HOME | End: 2024-06-26
Payer: MEDICARE

## 2024-06-26 DIAGNOSIS — M25.552 BILATERAL HIP PAIN: ICD-10-CM

## 2024-06-26 DIAGNOSIS — M25.551 BILATERAL HIP PAIN: ICD-10-CM

## 2024-06-26 PROCEDURE — 73523 X-RAY EXAM HIPS BI 5/> VIEWS: CPT

## 2024-06-26 PROCEDURE — 73523 X-RAY EXAM HIPS BI 5/> VIEWS: CPT | Mod: BILATERAL PROCEDURE | Performed by: STUDENT IN AN ORGANIZED HEALTH CARE EDUCATION/TRAINING PROGRAM

## 2024-07-09 ENCOUNTER — HOSPITAL ENCOUNTER (OUTPATIENT)
Dept: RADIOLOGY | Facility: CLINIC | Age: 69
Discharge: HOME | End: 2024-07-09
Payer: MEDICARE

## 2024-07-09 ENCOUNTER — APPOINTMENT (OUTPATIENT)
Dept: ORTHOPEDIC SURGERY | Facility: CLINIC | Age: 69
End: 2024-07-09
Payer: MEDICARE

## 2024-07-09 ENCOUNTER — PREP FOR PROCEDURE (OUTPATIENT)
Dept: ORTHOPEDIC SURGERY | Facility: CLINIC | Age: 69
End: 2024-07-09

## 2024-07-09 VITALS — WEIGHT: 205 LBS | BODY MASS INDEX: 29.35 KG/M2 | HEIGHT: 70 IN

## 2024-07-09 DIAGNOSIS — M25.552 BILATERAL HIP PAIN: ICD-10-CM

## 2024-07-09 DIAGNOSIS — M70.61 TROCHANTERIC BURSITIS OF RIGHT HIP: ICD-10-CM

## 2024-07-09 DIAGNOSIS — M25.551 BILATERAL HIP PAIN: ICD-10-CM

## 2024-07-09 DIAGNOSIS — M16.0 PRIMARY OSTEOARTHRITIS OF BOTH HIPS: Primary | ICD-10-CM

## 2024-07-09 PROCEDURE — 72170 X-RAY EXAM OF PELVIS: CPT | Performed by: STUDENT IN AN ORGANIZED HEALTH CARE EDUCATION/TRAINING PROGRAM

## 2024-07-09 PROCEDURE — 1125F AMNT PAIN NOTED PAIN PRSNT: CPT | Performed by: ORTHOPAEDIC SURGERY

## 2024-07-09 PROCEDURE — 72170 X-RAY EXAM OF PELVIS: CPT

## 2024-07-09 PROCEDURE — 99204 OFFICE O/P NEW MOD 45 MIN: CPT | Performed by: ORTHOPAEDIC SURGERY

## 2024-07-09 PROCEDURE — 1159F MED LIST DOCD IN RCRD: CPT | Performed by: ORTHOPAEDIC SURGERY

## 2024-07-09 ASSESSMENT — PAIN - FUNCTIONAL ASSESSMENT
PAIN_FUNCTIONAL_ASSESSMENT: NO/DENIES PAIN
PAIN_FUNCTIONAL_ASSESSMENT: 0-10

## 2024-07-09 ASSESSMENT — PAIN SCALES - GENERAL: PAINLEVEL_OUTOF10: 1

## 2024-07-09 ASSESSMENT — PAIN DESCRIPTION - DESCRIPTORS: DESCRIPTORS: DULL

## 2024-07-09 NOTE — LETTER
July 9, 2024     Julio Landaverde DO  [address] [address]    Patient: Osmar Pond   YOB: 1955   Date of Visit: 7/9/2024       Dear Dr. Julio Landaverde DO:    Thank you for referring Osmar Pond to me for evaluation. Below are my notes for this consultation.  If you have questions, please do not hesitate to call me. I look forward to following your patient along with you.       Sincerely,     Kanika Hallman DO      CC: No Recipients  ______________________________________________________________________________________    PRIMARY CARE PHYSICIAN: Julio Landaverde DO  REFERRING PROVIDER: Julio Landaverde DO  [address] [place] [id number]     CONSULT ORTHOPAEDIC: Hip Evaluation        ASSESSMENT & PLAN    IMPRESSION:  1.  Primary osteoarthritis, right hip    PLAN:  Discussed with patient findings above.  Reviewed his x-rays with him.  He does have severe arthritis especially medial pole of his right hip and is getting increasingly symptomatic and is affecting his quality of life.  Given that he is feeling improved with conservative treatment would like to discuss the option of surgical intervention.    Osmar Pond has radiographic and physical exam evidence of degenerative joint disease and wishes to pursue surgery. The patient appears to have sufficient symptoms to warrant surgical intervention and is an appropriate candidate for  RIGHT Total Hip Arthroplasty as evidenced by six months of unsuccessful non-operative treatment as outlined in the HPI, progressive symptoms including pain impacting sleep or causing fatigue, pain impacting work, pain worsened with weight bearing, and pain limiting ability to stay fit and healthy.     We had a lengthy discussion regarding the risk and benefit of surgery, the alternatives, limitations, and personnel involved. The include but are not limited to infection, persistent pain, instability, nerve injury, blood  clots, and medical complications. We also discussed the pre-operative course, surgery itself, and rehabilitation. Perioperative blood management and transfusion issues were discussed, and options clearly outlined. The patient consented to the use of allogenic blood if medically necessary.     The patient has elected to schedule surgery at this time or intents to call the office with a surgical date. Shared decision making occurred while obtaining informed consent. The patient with be scheduled for a pre-operative education class. The patient will be ordered appropriate preoperative labs to be completed for preadmission testing.     Robotic Assisted Surgery: No    - Preoperative Consults: PAT Clearance, Cardiac clearance  - Disposition: Extended recovery  - Anesthesia Plan: Spinal, local  - Implants: Tuyet MDM (history of short-term memory issues with Alzheimer's)  - Physical Therapy Plan: Home  - Pncu2Lpv: Yes  - Surgical Approach: Direct superior  - DVT PPx: Aspirin    Risk Assessment for Post-Op Antibiotics   -None present    I hereby indicate that these comorbidities may have a detrimental effect on this arthroplasty.   -None present      SUBJECTIVE  CHIEF COMPLAINT:   Chief Complaint   Patient presents with   • Right Hip - Pain   • Left Hip - Pain        HPI: Osmar Pond is a 69 y.o. patient. Osmar Pond has had progressive problems with the right hip over the past 1 year. They do not report any trauma. They do report any constant or progressive numbness or tingling in their legs.  Pain is looking on the side of the hip, radiation to the groin and down the anterior aspect of the thigh.  Their symptoms are interfering with activities which include a dull pain that travels to the knee and has severe pain when standing to long. XR done today and 6/26/2024.  Has had significant decline his activities, cannot get through grocery shopping without having pain.  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    FUNCTIONAL STATUS: not limited.  AMBULATORY STATUS:  independent  PREVIOUS TREATMENTS: Cortisone injection around 5/28/2024 with no improvement  Therapy   still taking  Tylenol as needed  HISTORY OF SURGERY ON AFFECTED HIP(S): No   BACK PAIN REPORTED: No       REVIEW OF SYSTEMS  Constitutional: See HPI for pain assessment, No significant weight loss, recent trauma  Cardiovascular: No chest pain, shortness of breath  Respiratory: No difficulty breathing, cough  Gastrointestinal: No nausea, vomiting, diarrhea, constipation  Musculoskeletal: Noted in HPI, positive for pain, restricted motion, stiffness  Integumentary: No rashes, easy bruising, redness   Neurological: no numbness or tingling in extremities, no gait disturbances   Psychiatric: No mood changes, memory changes, social issues  Heme/Lymph: no excessive swelling, easy bruising, excessive bleeding  ENT: No hearing changes  Eyes: No vision changes    No past medical history on file.     No Known Allergies     Past Surgical History:   Procedure Laterality Date   • OTHER SURGICAL HISTORY  11/05/2018    Hernia repair   • OTHER SURGICAL HISTORY  12/09/2021    Coronary artery bypass graft        Family History   Problem Relation Name Age of Onset   • Dementia Mother     • No Known Problems Father          Social History     Socioeconomic History   • Marital status:      Spouse name: Not on file   • Number of children: Not on file   • Years of education: Not on file   • Highest education level: Not on file   Occupational History   • Not on file   Tobacco Use   • Smoking status: Former     Types: Cigarettes   • Smokeless tobacco: Never   Substance and Sexual Activity   • Alcohol use: Not Currently   • Drug use: Never   • Sexual activity: Not on file   Other Topics Concern   • Not on file   Social History Narrative   • Not on file     Social Determinants of Health     Financial Resource Strain: Not on file   Food  "Insecurity: Not on file   Transportation Needs: Not on file   Physical Activity: Not on file   Stress: Not on file   Social Connections: Not on file   Intimate Partner Violence: Not on file   Housing Stability: Not on file        CURRENT MEDICATIONS:   Current Outpatient Medications   Medication Sig Dispense Refill   • acetaminophen (Tylenol 8 HOUR) 650 mg ER tablet Take 1 tablet (650 mg) by mouth every 8 hours if needed for mild pain (1 - 3). Do not crush, chew, or split.  Takes 2 at hs prn     • aspirin 81 mg EC tablet Take 1 tablet (81 mg) by mouth once daily. As directed     • atorvastatin (Lipitor) 40 mg tablet Take 1 tablet (40 mg) by mouth once daily. 90 tablet 3   • donepezil (Aricept) 5 mg tablet Take 1 tablet (5 mg) by mouth once daily at bedtime. 30 tablet 1   • multivitamin (Daily Multi-Vitamin) tablet Take by mouth once daily.       No current facility-administered medications for this visit.        OBJECTIVE    PHYSICAL EXAM      12/15/2023     1:20 PM 1/16/2024     3:56 PM 5/17/2024    12:12 PM 5/22/2024    10:34 AM 5/28/2024     1:43 PM 6/7/2024     3:04 PM 6/25/2024     2:40 PM   Vitals   Systolic 118 124 140 133 126 118 141   Diastolic 78 72 82 79 72 64 65   Heart Rate 69 63 55 67 66 70 50   Temp   36.4 °C (97.5 °F)       Height (in) 1.778 m (5' 10\") 1.778 m (5' 10\") 1.778 m (5' 10\")  1.778 m (5' 10\") 1.778 m (5' 10\") 1.778 m (5' 10\")   Weight (lb) 199.8 200.8 206 203.3 205 205 205   BMI 28.67 kg/m2 28.81 kg/m2 29.56 kg/m2 29.17 kg/m2 29.41 kg/m2 29.41 kg/m2 29.41 kg/m2   BSA (m2) 2.12 m2 2.12 m2 2.15 m2 2.13 m2 2.14 m2 2.14 m2 2.14 m2   Visit Report Report Report Report Report Report Report Report      There is no height or weight on file to calculate BMI.    GENERAL: A/Ox3, NAD. Appears healthy, well nourished  PSYCHIATRIC: Mood stable, appropriate memory recall  EYES: EOM intact, no scleral icterus  CARDIAC: regular rate  LUNGS: Breathing non-labored  SKIN: no erythema, rashes, or ecchymoses "     MUSCULOSKELETAL:  Laterality: right Hip Exam  - ROM, Extension: full, no flexion contracture  - Strength: Abduction 5/5 against resitance, Flexion 5/5  - Palpation: mild TTP along greater trochanter  - Log roll/IR exam: painful and limited motion. Pain with hip flexion past 90 degrees and internal rotation  - Straight leg raise: negative  - EHL/PF/DF motor intact  - Gait: antalgic to arthritic side, negative Trendelenburg gait   - Special Tests: none performed    NEUROVASCULAR:  - Neurovascular Status: sensation intact to light touch distally  - Capillary refill brisk at extremities, Bilateral dorsalis pedis pulse 2+       IMAGING:  Multiple views of the affected right hip(s) demonstrate: Severe arhritic changes noted especially medial compartment complete joint space narrowing, calcification of the fat pad, marginal Citic changes subchondral sclerosis.   X-rays were personally reviewed and interpreted by me.  Radiology reports were reviewed by me as well, if readily available at the time.        Kanika Hallman DO  Attending Surgeon  [facility] Adult [facility] Surgery  [place], [place]

## 2024-07-09 NOTE — PROGRESS NOTES
PRIMARY CARE PHYSICIAN: Julio Landaverde DO  REFERRING PROVIDER: Julio Landaverde DO  [address] [address]     CONSULT ORTHOPAEDIC: Hip Evaluation        ASSESSMENT & PLAN    IMPRESSION:  1.  Primary osteoarthritis, right hip    PLAN:  Discussed with patient findings above.  Reviewed his x-rays with him.  He does have severe arthritis especially medial pole of his right hip and is getting increasingly symptomatic and is affecting his quality of life.  Given that he is feeling improved with conservative treatment would like to discuss the option of surgical intervention.    Osmar Pond has radiographic and physical exam evidence of degenerative joint disease and wishes to pursue surgery. The patient appears to have sufficient symptoms to warrant surgical intervention and is an appropriate candidate for  RIGHT Total Hip Arthroplasty as evidenced by six months of unsuccessful non-operative treatment as outlined in the HPI, progressive symptoms including pain impacting sleep or causing fatigue, pain impacting work, pain worsened with weight bearing, and pain limiting ability to stay fit and healthy.     We had a lengthy discussion regarding the risk and benefit of surgery, the alternatives, limitations, and personnel involved. The include but are not limited to infection, persistent pain, instability, nerve injury, blood clots, and medical complications. We also discussed the pre-operative course, surgery itself, and rehabilitation. Perioperative blood management and transfusion issues were discussed, and options clearly outlined. The patient consented to the use of allogenic blood if medically necessary.     The patient has elected to schedule surgery at this time or intents to call the office with a surgical date. Shared decision making occurred while obtaining informed consent. The patient with be scheduled for a pre-operative education class. The patient will be ordered appropriate  preoperative labs to be completed for preadmission testing.     Robotic Assisted Surgery: No    - Preoperative Consults: PAT Clearance, Cardiac clearance  - Disposition: Extended recovery  - Anesthesia Plan: Spinal, local  - Implants: Tuyet MDM (history of short-term memory issues with Alzheimer's)  - Physical Therapy Plan: Home  - [id number]: Yes  - Surgical Approach: Direct superior  - DVT PPx: Aspirin    Risk Assessment for Post-Op Antibiotics   -None present    I hereby indicate that these comorbidities may have a detrimental effect on this arthroplasty.   -None present      SUBJECTIVE  CHIEF COMPLAINT:   Chief Complaint   Patient presents with    Right Hip - Pain    Left Hip - Pain        HPI: Osmar Pond is a 69 y.o. patient. Osmar Pond has had progressive problems with the right hip over the past 1 year. They do not report any trauma. They do report any constant or progressive numbness or tingling in their legs.  Pain is looking on the side of the hip, radiation to the groin and down the anterior aspect of the thigh.  Their symptoms are interfering with activities which include a dull pain that travels to the knee and has severe pain when standing to long. XR done today and 6/26/2024.  Has had significant decline his activities, cannot get through grocery shopping without having pain.  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    FUNCTIONAL STATUS: not limited.  AMBULATORY STATUS:  independent  PREVIOUS TREATMENTS: Cortisone injection around 5/28/2024 with no improvement  Therapy   still taking  Tylenol as needed  HISTORY OF SURGERY ON AFFECTED HIP(S): No   BACK PAIN REPORTED: No       REVIEW OF SYSTEMS  Constitutional: See HPI for pain assessment, No significant weight loss, recent trauma  Cardiovascular: No chest pain, shortness of breath  Respiratory: No difficulty breathing, cough  Gastrointestinal: No nausea, vomiting, diarrhea,  constipation  Musculoskeletal: Noted in HPI, positive for pain, restricted motion, stiffness  Integumentary: No rashes, easy bruising, redness   Neurological: no numbness or tingling in extremities, no gait disturbances   Psychiatric: No mood changes, memory changes, social issues  Heme/Lymph: no excessive swelling, easy bruising, excessive bleeding  ENT: No hearing changes  Eyes: No vision changes    No past medical history on file.     No Known Allergies     Past Surgical History:   Procedure Laterality Date    OTHER SURGICAL HISTORY  11/05/2018    Hernia repair    OTHER SURGICAL HISTORY  12/09/2021    Coronary artery bypass graft        Family History   Problem Relation Name Age of Onset    Dementia Mother      No Known Problems Father          Social History     Socioeconomic History    Marital status:      Spouse name: Not on file    Number of children: Not on file    Years of education: Not on file    Highest education level: Not on file   Occupational History    Not on file   Tobacco Use    Smoking status: Former     Types: Cigarettes    Smokeless tobacco: Never   Substance and Sexual Activity    Alcohol use: Not Currently    Drug use: Never    Sexual activity: Not on file   Other Topics Concern    Not on file   Social History Narrative    Not on file     Social Determinants of Health     Financial Resource Strain: Not on file   Food Insecurity: Not on file   Transportation Needs: Not on file   Physical Activity: Not on file   Stress: Not on file   Social Connections: Not on file   Intimate Partner Violence: Not on file   Housing Stability: Not on file        CURRENT MEDICATIONS:   Current Outpatient Medications   Medication Sig Dispense Refill    acetaminophen (Tylenol 8 HOUR) 650 mg ER tablet Take 1 tablet (650 mg) by mouth every 8 hours if needed for mild pain (1 - 3). Do not crush, chew, or split.  Takes 2 at hs prn      aspirin 81 mg EC tablet Take 1 tablet (81 mg) by mouth once daily. As directed  "     atorvastatin (Lipitor) 40 mg tablet Take 1 tablet (40 mg) by mouth once daily. 90 tablet 3    donepezil (Aricept) 5 mg tablet Take 1 tablet (5 mg) by mouth once daily at bedtime. 30 tablet 1    multivitamin (Daily Multi-Vitamin) tablet Take by mouth once daily.       No current facility-administered medications for this visit.        OBJECTIVE    PHYSICAL EXAM      12/15/2023     1:20 PM 1/16/2024     3:56 PM 5/17/2024    12:12 PM 5/22/2024    10:34 AM 5/28/2024     1:43 PM 6/7/2024     3:04 PM 6/25/2024     2:40 PM   Vitals   Systolic 118 124 140 133 126 118 141   Diastolic 78 72 82 79 72 64 65   Heart Rate 69 63 55 67 66 70 50   Temp   36.4 °C (97.5 °F)       Height (in) 1.778 m (5' 10\") 1.778 m (5' 10\") 1.778 m (5' 10\")  1.778 m (5' 10\") 1.778 m (5' 10\") 1.778 m (5' 10\")   Weight (lb) 199.8 200.8 206 203.3 205 205 205   BMI 28.67 kg/m2 28.81 kg/m2 29.56 kg/m2 29.17 kg/m2 29.41 kg/m2 29.41 kg/m2 29.41 kg/m2   BSA (m2) 2.12 m2 2.12 m2 2.15 m2 2.13 m2 2.14 m2 2.14 m2 2.14 m2   Visit Report Report Report Report Report Report Report Report      There is no height or weight on file to calculate BMI.    GENERAL: A/Ox3, NAD. Appears healthy, well nourished  PSYCHIATRIC: Mood stable, appropriate memory recall  EYES: EOM intact, no scleral icterus  CARDIAC: regular rate  LUNGS: Breathing non-labored  SKIN: no erythema, rashes, or ecchymoses     MUSCULOSKELETAL:  Laterality: right Hip Exam  - ROM, Extension: full, no flexion contracture  - Strength: Abduction 5/5 against resitance, Flexion 5/5  - Palpation: mild TTP along greater trochanter  - Log roll/IR exam: painful and limited motion. Pain with hip flexion past 90 degrees and internal rotation  - Straight leg raise: negative  - EHL/PF/DF motor intact  - Gait: antalgic to arthritic side, negative Trendelenburg gait   - Special Tests: none performed    NEUROVASCULAR:  - Neurovascular Status: sensation intact to light touch distally  - Capillary refill brisk at " extremities, Bilateral dorsalis pedis pulse 2+       IMAGING:  Multiple views of the affected right hip(s) demonstrate: Severe arhritic changes noted especially medial compartment complete joint space narrowing, calcification of the fat pad, marginal Citic changes subchondral sclerosis.   X-rays were personally reviewed and interpreted by me.  Radiology reports were reviewed by me as well, if readily available at the time.        Kanika Hallman DO  Attending Surgeon  Joint Replacement and Adult Reconstructive Surgery  [place], [place]

## 2024-07-15 ENCOUNTER — APPOINTMENT (OUTPATIENT)
Dept: CARDIOLOGY | Facility: CLINIC | Age: 69
End: 2024-07-15
Payer: MEDICARE

## 2024-07-15 ENCOUNTER — TELEPHONE (OUTPATIENT)
Dept: PREOP | Facility: HOSPITAL | Age: 69
End: 2024-07-15

## 2024-07-15 VITALS
SYSTOLIC BLOOD PRESSURE: 140 MMHG | WEIGHT: 208 LBS | BODY MASS INDEX: 29.78 KG/M2 | DIASTOLIC BLOOD PRESSURE: 78 MMHG | HEIGHT: 70 IN | OXYGEN SATURATION: 94 % | HEART RATE: 73 BPM

## 2024-07-15 DIAGNOSIS — I10 HYPERTENSION, UNSPECIFIED TYPE: ICD-10-CM

## 2024-07-15 DIAGNOSIS — Z95.1 S/P CABG X 4: ICD-10-CM

## 2024-07-15 DIAGNOSIS — Z01.810 PREOPERATIVE CARDIOVASCULAR EXAMINATION: Primary | ICD-10-CM

## 2024-07-15 DIAGNOSIS — I25.10 MULTIPLE VESSEL CORONARY ARTERY DISEASE: ICD-10-CM

## 2024-07-15 PROCEDURE — 1159F MED LIST DOCD IN RCRD: CPT | Performed by: PHYSICIAN ASSISTANT

## 2024-07-15 PROCEDURE — 1160F RVW MEDS BY RX/DR IN RCRD: CPT | Performed by: PHYSICIAN ASSISTANT

## 2024-07-15 PROCEDURE — 3077F SYST BP >= 140 MM HG: CPT | Performed by: PHYSICIAN ASSISTANT

## 2024-07-15 PROCEDURE — 3078F DIAST BP <80 MM HG: CPT | Performed by: PHYSICIAN ASSISTANT

## 2024-07-15 PROCEDURE — 1036F TOBACCO NON-USER: CPT | Performed by: PHYSICIAN ASSISTANT

## 2024-07-15 PROCEDURE — 99214 OFFICE O/P EST MOD 30 MIN: CPT | Performed by: PHYSICIAN ASSISTANT

## 2024-07-15 PROCEDURE — 93000 ELECTROCARDIOGRAM COMPLETE: CPT | Performed by: INTERNAL MEDICINE

## 2024-07-15 ASSESSMENT — HOOS JR
RISING FROM SITTING: MODERATE
WALKING ON UNEVEN SURFACE: MILD
SITTING: MILD
LYING IN BED (TURNING OVER, MAINTAINING HIP POSITION): MODERATE
HOOS JR TOTAL INTERVAL SCORE: 58.93
GOING UP OR DOWN STAIRS: MODERATE
BENDING TO THE FLOOR TO PICK UP OBJECT: MODERATE

## 2024-07-15 ASSESSMENT — ENCOUNTER SYMPTOMS
NAUSEA: 0
VOMITING: 0
ORTHOPNEA: 0
FEVER: 0
PALPITATIONS: 0
DIARRHEA: 0
DYSURIA: 0
WHEEZING: 0
ABDOMINAL PAIN: 0
WEAKNESS: 0
SHORTNESS OF BREATH: 0

## 2024-07-15 NOTE — PATIENT INSTRUCTIONS
I will send paperwork over to Dr. Hallman today.  Continue ASA uninterrupted.  See Dr. Shipman next Brianne.

## 2024-07-15 NOTE — PROGRESS NOTES
"Cardiology Follow Up  Chief Complaint:   Patient presents today for cardiovascular risk assessment.        History Of Present Illness:    Osmar Pond is a 69 y.o. male presenting for 6 month follow up  H/o severe multivessel CAD s/p CABG x4 (L-LAD, S-AM and PDA, Radial-OM1), pSVT, pVT, HTN, syncope, moderate RIOS, vertigo, ? Dementia.    Continues to feel \"tired,\" but is able to walk and mow the lawn.  Walks five times a week.  No chest pain, LEON, LE edema.     CV testing reviewed:     5/2024  TSH 1.46  11/2023 lipid labs  chol 159, HDL 71.6, LDL 70, trig 85   12/2023  TTE   EF 40-45% stage I DD, mildly reduced RV fxn, trace to mild mitral regurg, trace tricuspid regurg   2/2022 Holter monitor - average HR was 63 bpm. No afib or pauses. 64 episodes of pSVT (31 beats longest) and 11 episodes of NSVT (4 beats longest). 3.2% PVC burden. Patient events were associated with SB/SR with HR's 51 - 100 bpm.   7-15-24: Is a very pleasant 69-year-old patient known to Dr. Shipman.  He presents with his wife for preoperative risk assessment.  Patient needs to have right total hip arthroplasty scheduled for July 31.  The patient despite having pain has continued to walk and is not having any cardiac symptoms.  Patient has a history of bypass surgery with LIMA to LAD saphenous vein graft to the acute marginal and PDA and a radial graft to the OM1.  He has done very well postoperatively but does have some degree of LV dysfunction without signs or symptoms of CHF.  Previously had been on beta-blockers but had problems with bradycardia.  Based on assessment today RCRI score is 2 indicating a 6.6% risk of major adverse cardiac events.  These details explained to the patient and his wife and they verbalized understanding but would still like to proceed with the hip surgery.  EKG shows sinus rhythm with PVCs but again the patient is completely asymptomatic with the PVCs.     Last Recorded Vitals:  Vitals:    07/15/24 1404   BP: 140/78 " "  BP Location: Left arm   Patient Position: Sitting   BP Cuff Size: Adult   Pulse: 73   SpO2: 94%   Weight: 94.3 kg (208 lb)   Height: 1.778 m (5' 10\")       Past Medical History:  He has no past medical history on file.    Past Surgical History:  He has a past surgical history that includes Other surgical history (11/05/2018) and Other surgical history (12/09/2021).      Social History:  He reports that he has quit smoking. His smoking use included cigarettes. He has never used smokeless tobacco. He reports that he does not currently use alcohol. He reports that he does not use drugs.    Family History:  Family History   Problem Relation Name Age of Onset    Dementia Mother      No Known Problems Father          Allergies:  Patient has no known allergies.    Outpatient Medications:  Current Outpatient Medications   Medication Instructions    acetaminophen (TYLENOL 8 HOUR) 650 mg, oral, Every 8 hours PRN, Do not crush, chew, or split.  Takes 2 at hs prn    aspirin 81 mg EC tablet 1 tablet, oral, Daily, As directed    atorvastatin (LIPITOR) 40 mg, oral, Daily    donepezil (ARICEPT) 5 mg, oral, Nightly    multivitamin (Daily Multi-Vitamin) tablet oral, Daily RT     Review of Systems   Constitutional: Negative for fever and malaise/fatigue.   Cardiovascular:  Negative for chest pain, orthopnea and palpitations.   Respiratory:  Negative for shortness of breath and wheezing.    Skin:  Negative for itching and rash.   Musculoskeletal:         Chronic R hip pain--needs CLARISA   Gastrointestinal:  Negative for abdominal pain, diarrhea, nausea and vomiting.   Genitourinary:  Negative for dysuria.   Neurological:  Negative for weakness.      Physical Exam  Constitutional:       General: He is not in acute distress.     Appearance: Normal appearance.   HENT:      Mouth/Throat:      Mouth: Mucous membranes are moist.   Neck:      Comments: No JVD  Cardiovascular:      Rate and Rhythm: Normal rate and regular rhythm.      Heart " "sounds: Normal heart sounds. No murmur heard.  Pulmonary:      Effort: Pulmonary effort is normal.      Breath sounds: Normal breath sounds.   Abdominal:      General: Abdomen is flat. Bowel sounds are normal.      Palpations: Abdomen is soft.   Musculoskeletal:         General: No swelling.   Skin:     General: Skin is warm and dry.   Neurological:      Mental Status: He is alert and oriented to person, place, and time.   Psychiatric:         Mood and Affect: Mood normal.           Last Labs:  CBC -  Lab Results   Component Value Date    WBC 10.3 01/02/2024    HGB 14.9 01/02/2024    HCT 44.1 01/02/2024    MCV 94 01/02/2024     01/02/2024       CMP -  Lab Results   Component Value Date    CALCIUM 9.9 11/15/2023    PHOS 3.2 11/27/2021    PROT 6.4 11/15/2023    ALBUMIN 4.3 11/15/2023    AST 32 11/15/2023    ALT 29 11/15/2023    ALKPHOS 68 11/15/2023    BILITOT 0.6 11/15/2023       LIPID PANEL -   Lab Results   Component Value Date    CHOL 159 11/15/2023    TRIG 85 11/15/2023    HDL 71.6 11/15/2023    CHHDL 2.2 11/15/2023    LDLF 69 11/08/2022    VLDL 17 11/15/2023    NHDL 87 11/15/2023       RENAL FUNCTION PANEL -   Lab Results   Component Value Date    GLUCOSE 91 11/15/2023     11/15/2023    K 4.7 11/15/2023     11/15/2023    CO2 30 11/15/2023    ANIONGAP 11 11/15/2023    BUN 15 11/15/2023    CREATININE 0.77 11/15/2023    GFRMALE 90 11/08/2022    CALCIUM 9.9 11/15/2023    PHOS 3.2 11/27/2021    ALBUMIN 4.3 11/15/2023        No results found for: \"BNP\", \"HGBA1C\"    Last Cardiology Tests:    Echo:  Transthoracic echo (TTE) complete 12/06/2023--CONCLUSIONS:   1. Left ventricular systolic function is mildly to moderately decreased with a 40-45% estimated ejection fraction.   2. Spectral Doppler shows an impaired relaxation pattern of left ventricular diastolic filling.   3. There is mildly reduced right ventricular systolic function.    Cath:  CONCLUSIONS:   1. Three vessel CAD involving mid LAD (RFR " abnormal at 0.86), suboccluded OM2, and mid RCA .   2. No significant LV-AO peak to peak pullback gradient.   3. Left Ventricular end-diastolic pressure = 7.      Lab review: I have personally reviewed the laboratory result(s).    Assessment/Plan   Problem List Items Addressed This Visit             ICD-10-CM       Cardiac and Vasculature    Multiple vessel coronary artery disease I25.10    Hypertension I10    S/P CABG x 4 Z95.1    Preoperative cardiovascular examination - Primary Z01.810   Preoperative cardiovascular risk assessment--RCRI score is 2 indicating 6.6% risk of major adverse cardiac events.  No further cardiac testing is necessary at this time.  Again he does have some mild LV dysfunction with no signs or symptoms of CHF.  He has been tried on cardiac medications previously but had issues with bradycardia.  He is only on aspirin and atorvastatin we will continue the aspirin perioperatively.  Again EKG shows no acute ischemic change but he does have the PVCs which are not new.  Again he had been on beta-blockers but had problems with bradycardia so he is not on beta-blockers or any rate slowing meds.  History of CAD--despite the pain continues to walk and has been mowing and has no chest pain shortness of breath palpitations or anginal symptoms.  Hypertension--blood pressure is controlled on no specific cardiac medications and again he cannot be on any rate slowing drugs or beta-blockers due to bradycardia.  Right hip pain--scheduled for right total hip arthroplasty on July 31.  Again no further cardiac testing is necessary at this time.  Patient and wife verbal understanding of increased risk associated with this procedure and risk of major adverse cardiac events but again would still like to proceed.  Will send appropriate information over to Dr. Hallman.      Joseluis Ashley PA-C  7/15/2024  2:20 PM

## 2024-07-15 NOTE — PREPROCEDURE INSTRUCTIONS
Thank you for attending our Joint Replacement class today in preparation for your upcoming surgery.  Topics discussed include:     MyChart Enrollment  Communication with Care Team  My Chart is the best form of communication to reach all of your caregivers  You can send messages to specific care givers, or a care team  Continued Education  You will be enrolled in a Total Joint Replacement care plan to receive additional education before and after surgery  You can review a short recording of the class content  Access to Medical Records  You can access test results, office notes, appointments, etc.  You can connect to other healthcare systems who use [employer] ([facility], [facility], [facility], etc.)  [employer] Accounts  Program Information  Consent to Enroll     Background/Understanding of Joint Replacement Surgery  Potential for same day discharge  Any questions or concerns to be directed to the surgeon's office     How to Prepare for Surgery  Use of Nicotine Products/Smoking  Stop several weeks before surgery  Such products slow down the healing process and increase risk of post-op infection and complications  Clearance for Surgery  Medical Clearance by Specialists  Dental Clearance  Cracked/Broken/Loose teeth left untreated may postpone surgery  The importance of post-op antibiotics for dental visits per surgeon protocol  Preadmission Testing  **Potential for postponed surgery if appropriate clearance is not obtained  Medication Instruction  Follow instructions provided by the doctor who prescribes your medication (typically, but not limited to cardiologist)  Preadmission testing will provide additional instructions during your appointment on what to stop and what to take as you get closer to surgery  For clarification of these instructions, please call preadmission testing directly - [phone]  Tips for Preparing the home for discharge from the hospital  Care Partner  Requirement for surgery, the patient must have a plan to have  help at home  Potential for postponed surgery if plan for home support cannot be established  How the care partner can help after surgery  CHG Body Wash/Mouth Wash  Follow the instructions given at preadmission testing  Body wash is to be used on the body and hair for 5 washes  Mouthwash is to be used the night before and morning of surgery  **This is a system-wide protocol developed by infectious disease professionals, we will not alter our recommendations for those with sensitive skin or those who have special hair needs.  Please follow the instructions as they are written as this will provide the best infection prevention measures for surgery.  Should you have an allergy to one of the products, please discuss with your preadmission team**     What to Expect in the Hospital/At Home  Morning of Surgery NPO Guidelines  Nothing to eat after midnight  Water can be consumed up to 2 hours prior to arrival  Surgical and Post-Surgical Care Team  Surgical Team  Anesthesia Team  Nursing  Physical Therapy  Care Coordinating  Pharmacy  Hospital Arrival Instructions  Arrive at the time provided to you  Consider traffic patterns (rush-hour) based on arrival time  Have arrangements made for a ride home  If discharging same day, care partner should remain at the hospital  Recovering after Surgery  Recovery Room - Visitors are not brought back  Transition to hospital room - 2nd Floor, Visitors will be directed to your room  The presence of and strategies for controlling surgical pain and swelling  The importance of early mobility  Side effects after surgery  What to expect if staying overnight     Discharge Planning  The intended plan for discharge will be for patients to discharge home  All patients require a care partner (family, friend, neighbor, etc.) to stay with the patient for the first few nights after surgery  The inability to secure help at home will postpone surgery  Home Care Services set up per surgeon order  Physical  Therapy  Occupational Therapy  **If desired, private duty care can be arranged by the patient ahead of time**  Outpatient Physical Therapy per surgeon order     Recovering at Home  Wound Care  Follow wound care instructions found in your discharge paperwork  Bandage is water-resistant and you may shower with the bandage  Do not scrub directly over the bandage  Do not submerge in water until cleared (bathtub, hot tub, pool, etc.)     Post-Op Risk Prevention  Infection Prevention  Promptly seek treatment for any infections post-operatively  Routine dental visits must be postponed for 3 months after surgery  Your surgeon may require antibiotics prior to future dental visits  Any concerns for infection not related directly to the knee or the hip should be managed by your primary care provider  Blood Clots  Be sure to complete the course of blood thinning medication as prescribed by your surgeon  Movement every 1-2 hours during the day is encouraged to prevent blood clots  Monitor for signs of blood clots  Wear compression stockings as prescribed by your surgeon  Constipation  Constipation is common following surgery  Drink plenty of fluids  Take stool softener/laxative as prescribed by your surgeon  Move around frequently  Eat foods high in fiber  Fall Prevention  Prepare home ahead of time to clear space to move with walker  Remove throw rugs and electrical cords from walkways  Install railings near any stairways with more than 2 steps  Use night lights for increased visibility at night  Continue to use your assistive device until cleared by surgeon or physical therapy  Dislocation Prevention - Not all procedures will have dislocation precautions  Follow dislocation precautions provided by your surgeon  It is OK to resume sexual activity about 6 weeks following surgery  Be sure to follow any dislocation precautions assigned     Durable Medical Equipment  Cold Therapy  Breg Cold Therapy Machines  Ice/Gel  Packs  Assistive Devices  Folding Walker with Wheels (in the front only)  No Rollators  Crutches if approved by Physical Therapy and Surgeon after surgery  Hip Kits  Raised Toilet Seats  Additional Compression Stockings     Joint Preservation  Healthy Activities when Cleared  Walking  Swimming  Bike Riding  Activities to Avoid  Refrain from repetitive motions which have a high impact on the joint  Gradual Progression  Progress activity slowly, listen to your body  Common Findings - NORMAL after surgery  Clicking/Grinding  Numbness near incision     Physical Therapy  Prehabilitation exercises  START TODAY ON BOTH LEGS  Surgery Specific Precautions  Follow surgery specific precautions found in your discharge paperwork     Follow-Up Visit  All patients will see their surgeon for a follow up visit after surgery  The visit may range from 2-6 weeks after surgery and is surgeon specific        Please don't hesitate to reach out if you have any additional questions or concerns.    Yolanda Sutton [phone]

## 2024-07-18 ENCOUNTER — ANESTHESIA EVENT (OUTPATIENT)
Dept: OPERATING ROOM | Facility: HOSPITAL | Age: 69
End: 2024-07-18
Payer: MEDICARE

## 2024-07-19 RX ORDER — CHLORHEXIDINE GLUCONATE ORAL RINSE 1.2 MG/ML
SOLUTION DENTAL
Qty: 120 ML | Refills: 0 | Status: SHIPPED | OUTPATIENT
Start: 2024-07-19

## 2024-07-24 ENCOUNTER — PRE-ADMISSION TESTING (OUTPATIENT)
Dept: PREADMISSION TESTING | Facility: HOSPITAL | Age: 69
End: 2024-07-24
Payer: MEDICARE

## 2024-07-24 ENCOUNTER — HOSPITAL ENCOUNTER (OUTPATIENT)
Dept: RADIOLOGY | Facility: HOSPITAL | Age: 69
Discharge: HOME | End: 2024-07-24
Payer: MEDICARE

## 2024-07-24 VITALS
HEIGHT: 70 IN | TEMPERATURE: 97.6 F | RESPIRATION RATE: 20 BRPM | OXYGEN SATURATION: 97 % | SYSTOLIC BLOOD PRESSURE: 116 MMHG | BODY MASS INDEX: 29.06 KG/M2 | WEIGHT: 203 LBS | HEART RATE: 67 BPM | DIASTOLIC BLOOD PRESSURE: 70 MMHG

## 2024-07-24 DIAGNOSIS — M16.11 PRIMARY LOCALIZED OSTEOARTHRITIS OF RIGHT HIP: ICD-10-CM

## 2024-07-24 DIAGNOSIS — I25.10 MULTIPLE VESSEL CORONARY ARTERY DISEASE: ICD-10-CM

## 2024-07-24 DIAGNOSIS — Z95.1 S/P CABG X 4: ICD-10-CM

## 2024-07-24 DIAGNOSIS — Z01.818 PRE-OP EVALUATION: ICD-10-CM

## 2024-07-24 DIAGNOSIS — I10 HYPERTENSION, UNSPECIFIED TYPE: ICD-10-CM

## 2024-07-24 DIAGNOSIS — M16.0 PRIMARY OSTEOARTHRITIS OF BOTH HIPS: ICD-10-CM

## 2024-07-24 DIAGNOSIS — Z01.818 PRE-OP EVALUATION: Primary | ICD-10-CM

## 2024-07-24 LAB
ABO GROUP (TYPE) IN BLOOD: NORMAL
ANION GAP SERPL CALC-SCNC: 12 MMOL/L (ref 10–20)
ANTIBODY SCREEN: NORMAL
BUN SERPL-MCNC: 14 MG/DL (ref 6–23)
CALCIUM SERPL-MCNC: 9.7 MG/DL (ref 8.6–10.3)
CHLORIDE SERPL-SCNC: 106 MMOL/L (ref 98–107)
CO2 SERPL-SCNC: 24 MMOL/L (ref 21–32)
CREAT SERPL-MCNC: 0.79 MG/DL (ref 0.5–1.3)
EGFRCR SERPLBLD CKD-EPI 2021: >90 ML/MIN/1.73M*2
ERYTHROCYTE [DISTWIDTH] IN BLOOD BY AUTOMATED COUNT: 13 % (ref 11.5–14.5)
GLUCOSE SERPL-MCNC: 104 MG/DL (ref 74–99)
HCT VFR BLD AUTO: 43.4 % (ref 41–52)
HGB BLD-MCNC: 15.1 G/DL (ref 13.5–17.5)
MCH RBC QN AUTO: 31.6 PG (ref 26–34)
MCHC RBC AUTO-ENTMCNC: 34.8 G/DL (ref 32–36)
MCV RBC AUTO: 91 FL (ref 80–100)
NRBC BLD-RTO: 0 /100 WBCS (ref 0–0)
PLATELET # BLD AUTO: 282 X10*3/UL (ref 150–450)
POTASSIUM SERPL-SCNC: 4.3 MMOL/L (ref 3.5–5.3)
RBC # BLD AUTO: 4.78 X10*6/UL (ref 4.5–5.9)
RH FACTOR (ANTIGEN D): NORMAL
SODIUM SERPL-SCNC: 138 MMOL/L (ref 136–145)
WBC # BLD AUTO: 10.3 X10*3/UL (ref 4.4–11.3)

## 2024-07-24 PROCEDURE — 71046 X-RAY EXAM CHEST 2 VIEWS: CPT

## 2024-07-24 PROCEDURE — 85027 COMPLETE CBC AUTOMATED: CPT

## 2024-07-24 PROCEDURE — 87081 CULTURE SCREEN ONLY: CPT | Mod: PORLAB

## 2024-07-24 PROCEDURE — 36415 COLL VENOUS BLD VENIPUNCTURE: CPT

## 2024-07-24 PROCEDURE — 80048 BASIC METABOLIC PNL TOTAL CA: CPT

## 2024-07-24 PROCEDURE — 86901 BLOOD TYPING SEROLOGIC RH(D): CPT

## 2024-07-24 RX ORDER — LANOLIN ALCOHOL/MO/W.PET/CERES
1000 CREAM (GRAM) TOPICAL DAILY
COMMUNITY

## 2024-07-24 RX ORDER — CHOLECALCIFEROL (VITAMIN D3) 50 MCG
50 TABLET ORAL DAILY
COMMUNITY

## 2024-07-24 ASSESSMENT — LIFESTYLE VARIABLES: SMOKING_STATUS: NONSMOKER

## 2024-07-24 NOTE — H&P (VIEW-ONLY)
CPM/PAT Evaluation       Name: Osmar Pond (Osmar Pond)  /Age: 1955/69 y.o.     In-Person       Chief Complaint: Scheduled for right total hip arthroplasty under general/spinal anesthesia per Dr. Hallman on 24.       Past Medical History:   Diagnosis Date    Anxiety     Arthritis     Cognitive disorder     SHORT TERM MEMORY    Coronary artery disease     2021    Hearing aid worn     BILATERAL    Vertigo     SINUS    Vision loss     Wears partial dentures     UPPER AND LOWER       Past Surgical History:   Procedure Laterality Date    OTHER SURGICAL HISTORY  2018    INGUINAL WITH MESH    OTHER SURGICAL HISTORY  2021    Coronary artery bypass graft       Patient  has no history on file for sexual activity.    Family History   Problem Relation Name Age of Onset    Dementia Mother      No Known Problems Father         No Known Allergies    Prior to Admission medications    Medication Sig Start Date End Date Taking? Authorizing Provider   acetaminophen (Tylenol 8 HOUR) 650 mg ER tablet Take 1 tablet (650 mg) by mouth every 8 hours if needed for mild pain (1 - 3). Do not crush, chew, or split.  Takes 2 at hs prn    Historical Provider, MD   aspirin 81 mg EC tablet Take 1 tablet (81 mg) by mouth once daily. As directed 10/29/21   Historical Provider, MD   atorvastatin (Lipitor) 40 mg tablet Take 1 tablet (40 mg) by mouth once daily. 23  Brayan Shipman MD   chlorhexidine (Peridex) 0.12 % solution Swish and Spit 15 ml the night before and the morning of surgery. (2 Doses) 24   Trinidad Acosta, APRN-CNS   cholecalciferol (Vitamin D3) 50 MCG (2000 UT) tablet Take 1 tablet (50 mcg) by mouth once daily.    Historical Provider, MD   cyanocobalamin (Vitamin B-12) 1,000 mcg tablet Take 1 tablet (1,000 mcg) by mouth once daily. Takes 5000 mcg    Historical Provider, MD   donepezil (Aricept) 5 mg tablet Take 1 tablet (5 mg) by mouth once daily at bedtime. 24   Meir Mendez  MD   multivitamin (Daily Multi-Vitamin) tablet Take by mouth once daily. 12/9/21   Historical Provider, MD        Visit Vitals  /70   Pulse 67   Temp 36.4 °C (97.6 °F) (Oral)   Resp 20       DASI Risk Score    No data to display       Caprini DVT Assessment      Flowsheet Row Most Recent Value   DVT Score 16   Current Status Elective major lower extremity arthroplasty, Major surgery planned, lasting 2-3 hours   History Prior major surgery, Acute myocardial infarction, SVT, DVT/PE   Age 60-75 years   BMI 30 or less          Modified Frailty Index    No data to display       CHADS2 Stroke Risk  Current as of 43 minutes ago        N/A 3 to 100%: High Risk   2 to < 3%: Medium Risk   0 to < 2%: Low Risk     Last Change: N/A          This score determines the patient's risk of having a stroke if the patient has atrial fibrillation.        This score is not applicable to this patient. Components are not calculated.          Revised Cardiac Risk Index      Flowsheet Row Most Recent Value   Revised Cardiac Risk Calculator 2          Apfel Simplified Score      Flowsheet Row Most Recent Value   Apfel Simplified Score Calculator 2          Risk Analysis Index Results This Encounter    No data found in the last 1 encounters.       Stop Bang Score      Flowsheet Row Most Recent Value   Do you snore loudly? 1   Do you often feel tired or fatigued after your sleep? 0   Has anyone ever observed you stop breathing in your sleep? 0   Do you have or are you being treated for high blood pressure? 1   Recent BMI (Calculated) 29.8   Is BMI greater than 35 kg/m2? 0=No   Age older than 50 years old? 1=Yes   Is your neck circumference greater than 17 inches (Male) or 16 inches (Female)? 0   Gender - Male 1=Yes   STOP-BANG Total Score 4          Results for orders placed or performed in visit on 07/24/24 (from the past 24 hour(s))   CBC   Result Value Ref Range    WBC 10.3 4.4 - 11.3 x10*3/uL    nRBC 0.0 0.0 - 0.0 /100 WBCs    RBC 4.78  4.50 - 5.90 x10*6/uL    Hemoglobin 15.1 13.5 - 17.5 g/dL    Hematocrit 43.4 41.0 - 52.0 %    MCV 91 80 - 100 fL    MCH 31.6 26.0 - 34.0 pg    MCHC 34.8 32.0 - 36.0 g/dL    RDW 13.0 11.5 - 14.5 %    Platelets 282 150 - 450 x10*3/uL      Assessment and Plan:     Cardiovascular:  Musculoskeletal:  Scheduled for right total hip arthroplasty under general/spinal anesthesia per Dr. Hallman on 7/31/24.   CBC, BMP ordered. CBC WNL, Awaiting BMP results.   EKG shows SR with PVC's, dated 7/15/24.   CXR, MRSA swab, T&C ordered. Results pending.   Patient has H&P and Cardiac Clearance dated 7/15/24 per Jermaine BRICEÑO, 'Increased Risk' for upcoming surgery.   Peridex mouth rinse script sent to [facility] in [place].   Patient attended the Total [facility] Class  Has Hibiclens.  All surgery instructions reviewed with patient by RN. Verbalized understanding.

## 2024-07-24 NOTE — CPM/PAT H&P
CPM/PAT Evaluation       Name: Osmar Pond (Osmar Pond)  /Age: 1955/69 y.o.     In-Person       Chief Complaint: Scheduled for right total hip arthroplasty under general/spinal anesthesia per Dr. Hallman on 24.       Past Medical History:   Diagnosis Date    Anxiety     Arthritis     Cognitive disorder     SHORT TERM MEMORY    Coronary artery disease     2021    Hearing aid worn     BILATERAL    Vertigo     SINUS    Vision loss     Wears partial dentures     UPPER AND LOWER       Past Surgical History:   Procedure Laterality Date    OTHER SURGICAL HISTORY  2018    INGUINAL WITH MESH    OTHER SURGICAL HISTORY  2021    Coronary artery bypass graft       Patient  has no history on file for sexual activity.    Family History   Problem Relation Name Age of Onset    Dementia Mother      No Known Problems Father         No Known Allergies    Prior to Admission medications    Medication Sig Start Date End Date Taking? Authorizing Provider   acetaminophen (Tylenol 8 HOUR) 650 mg ER tablet Take 1 tablet (650 mg) by mouth every 8 hours if needed for mild pain (1 - 3). Do not crush, chew, or split.  Takes 2 at hs prn    Historical Provider, MD   aspirin 81 mg EC tablet Take 1 tablet (81 mg) by mouth once daily. As directed 10/29/21   Historical Provider, MD   atorvastatin (Lipitor) 40 mg tablet Take 1 tablet (40 mg) by mouth once daily. 23  Brayan Shipman MD   chlorhexidine (Peridex) 0.12 % solution Swish and Spit 15 ml the night before and the morning of surgery. (2 Doses) 24   Trinidad Acosta, APRN-CNS   cholecalciferol (Vitamin D3) 50 MCG (2000 UT) tablet Take 1 tablet (50 mcg) by mouth once daily.    Historical Provider, MD   cyanocobalamin (Vitamin B-12) 1,000 mcg tablet Take 1 tablet (1,000 mcg) by mouth once daily. Takes 5000 mcg    Historical Provider, MD   donepezil (Aricept) 5 mg tablet Take 1 tablet (5 mg) by mouth once daily at bedtime. 24   Meir Mendez  MD   multivitamin (Daily Multi-Vitamin) tablet Take by mouth once daily. 12/9/21   Historical Provider, MD        Visit Vitals  /70   Pulse 67   Temp 36.4 °C (97.6 °F) (Oral)   Resp 20       DASI Risk Score    No data to display       Caprini DVT Assessment      Flowsheet Row Most Recent Value   DVT Score 16   Current Status Elective major lower extremity arthroplasty, Major surgery planned, lasting 2-3 hours   History Prior major surgery, Acute myocardial infarction, SVT, DVT/PE   Age 60-75 years   BMI 30 or less          Modified Frailty Index    No data to display       CHADS2 Stroke Risk  Current as of 43 minutes ago        N/A 3 to 100%: High Risk   2 to < 3%: Medium Risk   0 to < 2%: Low Risk     Last Change: N/A          This score determines the patient's risk of having a stroke if the patient has atrial fibrillation.        This score is not applicable to this patient. Components are not calculated.          Revised Cardiac Risk Index      Flowsheet Row Most Recent Value   Revised Cardiac Risk Calculator 2          Apfel Simplified Score      Flowsheet Row Most Recent Value   Apfel Simplified Score Calculator 2          Risk Analysis Index Results This Encounter    No data found in the last 1 encounters.       Stop Bang Score      Flowsheet Row Most Recent Value   Do you snore loudly? 1   Do you often feel tired or fatigued after your sleep? 0   Has anyone ever observed you stop breathing in your sleep? 0   Do you have or are you being treated for high blood pressure? 1   Recent BMI (Calculated) 29.8   Is BMI greater than 35 kg/m2? 0=No   Age older than 50 years old? 1=Yes   Is your neck circumference greater than 17 inches (Male) or 16 inches (Female)? 0   Gender - Male 1=Yes   STOP-BANG Total Score 4          Results for orders placed or performed in visit on 07/24/24 (from the past 24 hour(s))   CBC   Result Value Ref Range    WBC 10.3 4.4 - 11.3 x10*3/uL    nRBC 0.0 0.0 - 0.0 /100 WBCs    RBC 4.78  4.50 - 5.90 x10*6/uL    Hemoglobin 15.1 13.5 - 17.5 g/dL    Hematocrit 43.4 41.0 - 52.0 %    MCV 91 80 - 100 fL    MCH 31.6 26.0 - 34.0 pg    MCHC 34.8 32.0 - 36.0 g/dL    RDW 13.0 11.5 - 14.5 %    Platelets 282 150 - 450 x10*3/uL      Assessment and Plan:     Cardiovascular:  Musculoskeletal:  Scheduled for right total hip arthroplasty under general/spinal anesthesia per Dr. Hallman on 7/31/24.   CBC, BMP ordered. CBC WNL, Awaiting BMP results.   EKG shows SR with PVC's, dated 7/15/24.   CXR, MRSA swab, T&C ordered. Results pending.   Patient has H&P and Cardiac Clearance dated 7/15/24 per Jermaine BRICEÑO, 'Increased Risk' for upcoming surgery.   Peridex mouth rinse script sent to [facility] in [place].   Patient attended the Total Joint Class  Has Hibiclens.  All surgery instructions reviewed with patient by RN. Verbalized understanding.

## 2024-07-24 NOTE — PREPROCEDURE INSTRUCTIONS
Medication List            Accurate as of July 24, 2024  8:06 AM. Always use your most recent med list.                acetaminophen 650 mg ER tablet  Commonly known as: Tylenol 8 HOUR     aspirin 81 mg EC tablet     atorvastatin 40 mg tablet  Commonly known as: Lipitor  Take 1 tablet (40 mg) by mouth once daily.     chlorhexidine 0.12 % solution  Commonly known as: Peridex  Swish and Spit 15 ml the night before and the morning of surgery. (2 Doses)     cyanocobalamin 1,000 mcg tablet  Commonly known as: Vitamin B-12     Daily Multi-Vitamin tablet  Generic drug: multivitamin     donepezil 5 mg tablet  Commonly known as: Aricept  Take 1 tablet (5 mg) by mouth once daily at bedtime.     Vitamin D3 50 MCG (2000 UT) tablet  Generic drug: cholecalciferol                              NPO Instructions:    Nothing to eat or drink after midnight  Peridex swish and spit night before and morning of   Stop multivitamin today   Continue aspirin but not day of surgery  Hydrate well the day before    Additional Instructions:     Wear  comfortable loose fitting clothing  Do not use moisturizers, creams, lotions or perfume  All jewelry and valuables should be left at home    Hibiclens shower per instructions below neck line and away from private area   Please call [phone] with any questions

## 2024-07-26 LAB — STAPHYLOCOCCUS SPEC CULT: NORMAL

## 2024-07-30 PROCEDURE — RXMED WILLOW AMBULATORY MEDICATION CHARGE

## 2024-07-30 RX ORDER — TRAMADOL HYDROCHLORIDE 50 MG/1
50 TABLET ORAL EVERY 6 HOURS PRN
Qty: 28 TABLET | Refills: 0 | Status: SHIPPED | OUTPATIENT
Start: 2024-07-30 | End: 2024-08-07

## 2024-07-30 RX ORDER — NAPROXEN SODIUM 220 MG/1
81 TABLET, FILM COATED ORAL 2 TIMES DAILY
Qty: 60 TABLET | Refills: 0 | Status: SHIPPED | OUTPATIENT
Start: 2024-07-30 | End: 2024-08-30

## 2024-07-30 RX ORDER — CYCLOBENZAPRINE HCL 5 MG
5 TABLET ORAL 3 TIMES DAILY PRN
Qty: 30 TABLET | Refills: 0 | Status: SHIPPED | OUTPATIENT
Start: 2024-07-30 | End: 2024-08-10

## 2024-07-30 RX ORDER — SENNOSIDES 8.6 MG/1
1 TABLET ORAL 2 TIMES DAILY
Qty: 60 TABLET | Refills: 0 | Status: SHIPPED | OUTPATIENT
Start: 2024-07-30 | End: 2024-08-30

## 2024-07-30 RX ORDER — PANTOPRAZOLE SODIUM 40 MG/1
40 TABLET, DELAYED RELEASE ORAL DAILY PRN
Qty: 30 TABLET | Refills: 0 | Status: SHIPPED | OUTPATIENT
Start: 2024-07-30 | End: 2024-08-30

## 2024-07-30 RX ORDER — CELECOXIB 200 MG/1
200 CAPSULE ORAL 2 TIMES DAILY
Qty: 60 CAPSULE | Refills: 0 | Status: SHIPPED | OUTPATIENT
Start: 2024-07-30 | End: 2024-08-30

## 2024-07-30 RX ORDER — OXYCODONE HYDROCHLORIDE 5 MG/1
5 TABLET ORAL EVERY 6 HOURS PRN
Qty: 28 TABLET | Refills: 0 | Status: SHIPPED | OUTPATIENT
Start: 2024-07-30 | End: 2024-08-07

## 2024-07-30 RX ORDER — ACETAMINOPHEN 325 MG/1
1000 TABLET ORAL EVERY 8 HOURS PRN
Qty: 90 TABLET | Refills: 1 | Status: SHIPPED | OUTPATIENT
Start: 2024-07-30

## 2024-07-30 NOTE — DISCHARGE INSTRUCTIONS
Kanika Hallman DO  Phone: [phone] Dorita Resendiz, Medical Assistant    PLEASE READ CAREFULLY BEFORE CONTACTING YOUR PROVIDER.    WE WORK COLLABORATIVELY AS A TEAM. CALLING MULTIPLE STAFF MEMBERS REGARDING THE SAME ISSUE WILL DELAY YOUR CARE.  MYCHART IS THE PREFERRED COMMUNICATION FOR ALL TEAM MEMBERS.  ________________________________________________________________________________________________________________________________________________________________________    After Surgery Instructions: TOTAL HIP ARTHROPLASTY    The following is a general guide and may be applied to most patients that go home from the hospital after surgery. If you go to a rehab facility the timeline may deviate a little. Please do not hesitate to call our office for any questions or additional guidance. We will work with you to get the best experience from your new joint replacement.     WEEK 1  Relax…Don't Overdo it!  - Get up once an hour for light activity while you are awake. (get a drink, go to the bathroom, etc.)  - Keep the surgical site iced and elevated above your heart, if possible, while you are resting.  - You will have some light exercises given to you from the physical therapist in the hospital. They will teach you posterior hip precautions that you should be mindful of for the first six weeks after surgery.    SWELLING AND BRUISING    BRUISING AND SWELLING AFTER SURGERY IS NORMAL. As the patient becomes more mobile the bruising and swelling will begin to migrate towards the ankle and foot and is usually noticed toward the end of the day.    WEEK 2-3  You will have a prescription for physical therapy given to you or electronically prescribed and you should start outpatient therapy one week after your operation. Be sure to do the home exercises on the days you are not attending physical therapy.    - Continue to progress walking as tolerated.   - Continue to use ice for soreness on the surgical site  - You may wean from  your walker to a cane when you feel safe and comfortable to do so. Your physical therapist will also be helpful with progressing your assistive device.   - Be careful with bending and twisting - If it hurts, stop!!    FOLLOW UP  - Your first post-operative visit with Dr. Kanika Hallman should have been scheduled already. Please call [phone] for appointment questions  - You do not need new xrays for this visit  - Don't be nervous! This visit is to see how you are doing and make sure your incision is healing nicely and have begun working with physical therapy.       MEDICATION REFILLS - Please call main office number: [phone]    You will not receive a call indicating that your prescription has been filled.  Please contact your pharmacy with any questions.    Medication refills will be filled Monday-Friday 7am to 1pm ONLY. Please call the office or send a [facility] message for a refill request.  Any requests received outside of this timeframe will be handled on the next business day.  The office staff and orthopedic nurses cannot refill medications; messages should be left directly through the office or via my chart.  Please do not call multiple times or call other members of the care team for medication needs, this will cause the refill to take longer.    Per State and Institutional policy, pain medications can only be refilled every 7 days for up to six weeks following surgery.    DRIVING & TRAVEL AFTER SURGERY   Patients should anticipate waiting at least 3-6 weeks before traveling long distances after surgery.  At minimum you cannot be using your walker before considering driving and you cannot take any narcotic medications prior to driving. You will need to stop to walk around ever 1 hour during your travel to help with blood clot prevention.  Please call the office or your joint nurse to discuss prior to post-surgical travel.  Patients may not drive until cleared by the joint nurse or the  office.    DENTAL PROCEDURES & CLEANINGS  You must wait a minimum of 3 months for elective dental appointments (if possible, we understand emergencies happen), including routine cleanings or dental work including bridges, crowns, extractions, etc..  For any dental visit - cleaning or dental procedures - patients must take an antibiotic 1 hour before the appointment.  Antibiotics are a lifelong need before dental appointments.  The antibiotic prescribed will be based on each patient's allergies.    WOUND CARE  You will have an ace wrap on your leg put on during surgery. This compression wrap is for comfort and may be removed 24 hours after surgery. You may continue to use compression throughout your recovery if this is comfortable for you.  You have a waterproof bandage on your wound and may shower with this on. The waterproof bandage is to remain in place for 7 days. You may remove it yourself. You may leave your incision open to air after the bandage has been removed.  Under your waterproof bandage you have a mesh and glue dressin in place. Do not peel this off. This will be on for 3-4 weeks. You may trim the edges as they peel off on their own. You can continue to shower with this on. Let the water run freely over your incision when showering and do not scrub.   You may shower upon discharging from the hospital.  Soap and water is permitted to run over the surgical dressing.  Do not scrub directly over these items.  DO NOT soak your incision in a bath, hot tub, pool or pond/lake for a minimum of 3 weeks following your surgery.  DO NOT use lotions, creams, ointments on your wound for a minimum of 3 weeks following your surgery. At that time you may use vitamin E to assist with softening of your incision.    RESTARTING HOME ROUTINE - DIET & MEDICATIONS  Post-operative constipation can result due to a combination of inactivity, anesthesia and pain medication. To help prevent this, you should increase your water and  fiber intake. Physical activity such as walking will also help stimulate the bowels.   You may resume your normal diet when you discharge home.  Choose foods that help promote good bowel habits and prevent constipation, such as foods high in fiber.  You may restart your home medications the following day after your surgery UNLESS you have been given alternate instructions.  Follow the instructions given to you on your hospital discharge instructions for more information regarding your home medications.    IN-HOME PHYSICAL THERAPY & OUTPATIENT PHYSICAL THERAPY  Continue the exercises you were given in the hospital until you have been seen by in-home therapy.  Make sure to provide a phone number with the ability for the home care staff to leave a message if you do not answer your phone.    Depending on your treatment plan you will begin outpatient or home therapy after surgery. This should be arranged through the office of hospital during discharge  FOR PATIENT DOING HOME THERAPY: Outpatient physical therapy following knee replacement surgery should begin 2-3 weeks after surgery.  You should call to schedule this appointment ASAP if not already scheduled before surgery.  Waiting until you are ready for outpatient physical therapy will cause a delay in your care.  You may choose any outpatient physical therapy location.  Call the office for an order if needed.    EMERGENCIES - WHEN TO CONTACT THE SURGEON'S OFFICE IMMEDIATELY  Fever >101 with chills that has been present for at least 48 hours.   Excessive bleeding from incision that will not slow down. A small amount of drainage is normal and expected.  Once pressure is applied and the area is covered, do not continue to check the area regularly.  This will remove pressure and bleeding will continue.  Leave in place for 4-6 hours.  Signs of infection of incision-excessive drainage that is soaking through your dressing (especially if it is pus-like), redness that is  spreading out from the edges of your incision, or increased warmth around the area.  Excruciating pain for which the pain medication, taken as instructed, is not helping.  Severe calf pain.  Go directly to the emergency room or call 911, if you are experiencing chest pain or difficulty breathing.      ICE & COLD THERAPY INSTRUCTIONS    To assist with pain control and post-op swelling, you should be using ice regularly throughout recovery, especially for the first 6 weeks, regardless of the cold therapy method you use.      Always make sure there is a layer of protection between the cold pad and your skin.    If you are using ICE PACKS or GEL PACKS, you will need to alternate 20 minutes on, 20 minutes off twice per hour.    If you are using an ICE MACHINE, please follow the provided ice machine instructions.  These devices differ from ice or ice packs whereas the mechanism circulates water through tubing and a pad to provide longer periods of cold therapy to the desired site.  You can use your cold devices around the clock for optimal comfort.  We recommend using cold therapy after working with therapy or completing exercises on your own.  There is no set schedule in which you must follow while using cold therapy.  Below are a few points to remember when using a cold therapy device:    You do not need to need to use the 20 on, 20 off method.  Detach the pad from the cooler and ambulate at least once every hour.  You can check your skin under the pad at this time.  You may wear the cold therapy device during periods of sleep including overnight.  If you wake up during the night, you can check the skin at this time.  You do not need to wake up specifically to perform skin checks.  Empty the cooler and pad when device is not in use.  Follow 's instructions for cleaning your cold therapy device.      DISCHARGE MEDICATIONS - Please reference the sample schedule on the reverse side for instructions on how to  best schedule medications.    PAIN MEDICATION    _______ Oxycodone   Oxycodone has been prescribed for post-operative pain control. Take one 5 mg tablet every 6 hours for pain. Alternate with Tramadol. See medication example sheet. This medication will only be refilled ONCE every 7 days for a period of 6 weeks. After 6 weeks, you will transition to acetaminophen (Tylenol) and over -the- counter anti-inflammatories such as Ibuprofen, Advil or Aleve in conjunction with ICE.    Side effects may be constipation and nausea, vomiting, sleepiness, dizziness, lightheadedness, headache, blurred vision, dry mouth sweating, itching (if you have itching, over-the -counter Benadryl can be used as needed).   You may NOT operate a motor vehicle while taking this medication or have been cleared by your surgeon or PA.     ________ Tramadol   Tramadol has been prescribed for post-operative pain control. Take one 50 mg tablet every 6 hours for pain. Alternate with Oxycodone. See medication example sheet. This medication will only be refilled ONCE every 7 days for a period of 6 weeks. After 6 weeks, you will transition to acetaminophen (Tylenol) and over- the- counter anti-inflammatories such as Ibuprofen, Advil or Aleve in conjunction with ICE.    Side effects may be constipation and nausea, vomiting, sleepiness, dizziness, lightheadedness, headache, blurred vision, dry mouth, sweating, itching (if you have itching, over-the -counter Benadryl can be used as needed).   You may NOT operate a motor vehicle while taking this medication or have been cleared by your surgeon or PA.    NON-NARCOTIC PAIN MEDICATION     _________ Celecoxib (Celebrex) or Meloxicam (Mobic) - Prescription anti-inflammatory medication   One of these will be prescribed (if you are able to take it) as an adjunct anti-inflammatory to assist in pain control. Take one twice daily for 4 weeks. See medication example sheet. You will not receive refills on this  medication.   Side effects may include nausea or upset stomach    _________ Acetaminophen (Tylenol)   Acetaminophen has been prescribed as an adjunct for pain control. Take two 500 mg tablet every 6-8 hours for 4 weeks. See medication example sheet. No refills will be given after initial prescription.   Side effects may include nausea, heartburn, drowsiness, and headache.    _________Cyclobenzaprine (Flexeril)   This is a muscle relaxer that will be prescribed    Take this AS NEEDED per instructions on bottle   This will be only prescribed once and is available to help with muscle spasms. There will be no refills of this medication    BLOOD THINNER    __________ Aspirin or Other Blood Thinner   Aspirin or another medication has been prescribed as a blood thinner to prevent blood clots in your leg or lungs. Take as prescribed on the bottle for 4 weeks. You will not receive a refill on this medication.   Do not take this medication if you are on another blood thinner.    ANTI NAUSEA    __________ Pantoprazole   Pantoprazole has been prescribed to help with nausea and protect your stomach while taking pain medication. Take one 40 mg tablet once daily for 4 weeks. You will not receive a refill on this medication.    ANTIBIOTIC     If you are deemed “high risk” for infection after surgery and antibiotic will be prescribed. Please take this as directed for one week.     STOOL SOFTENERS    __________ Senna   Post-operative constipation can result due to a combination of inactivity, anesthesia and pain medication. To help prevent this, you should increase your water and fiber intake. Physical activity such as walking will also help stimulate the bowels.    Senna has been prescribed to help with constipation while on Oxycodone and Tramadol. Take one tablet twice daily for 4 weeks. No refills will be given.   You may also take Miralax in combination with Senna to prevent constipation.  This can be purchased over the counter  at your local pharmacy.  Take as instructed on the bottle.   Pain Medication Refills -[phone] or MyChart- Monday through Friday 7am-1pm    Medication refills will be sent upon receipt of your request during the times listed above. Due to the high call volume, you will not receive a call confirming prescription refills; please do not call multiple times.  Prescription refills may take a few hours to process, you may follow up with your pharmacy for pickup availability.    SAMPLE              The times below are an example of how to organize medications to optimize pain control  Your actual medication schedule may vary based on your last dose taken IN THE HOSPITAL      Time 3:00am 6:00am 9:00am 12:00pm 3:00pm 6:00pm 9:00pm 12:00am   Medications Tramadol Acetaminophen (Tylenol)   Oxycodone  Miralax   Blood Thinner  Colace  Pantoprazole  Tramadol  Meloxicam Acetaminophen (Tylenol)   Oxycodone Tramadol Acetaminophen (Tylenol)   Oxycodone  Miralax Blood Thinner  Colace  Tramadol   Acetaminophen (Tylenol)   Oxycodone            You may begin to wean off the pain medication as your pain remains controlled with increased activity.  The schedules provided are meant to serve as an example.  You may wean off based on your pain control.  Please note that pain medications are not filled beyond 6 weeks after surgery.              The times below are an example of how to WEAN OFF medications WHILE CONTINUING TO OPTIMIZE PAIN CONTROL.  Your actual medication schedule may vary based on your last dose taken.  Time 12:00am 4:00am 8:00am 12:00pm 4:00pm 8:00pm   Med Tramadol Oxycodone   Tramadol Oxycodone Tramadol Oxycodone     Time 12:00am 6:00am 12:00pm 6:00pm   Med Tramadol Oxycodone   Tramadol Oxycodone     Time 12:00am 8:00am 4:00pm   Med Tramadol Oxycodone   Tramadol     Time 12:00am 12:00pm   Med Tramadol Tramadol        _________________________________________________________________________________________________________________________________________________________________________    OFFICE INFORMATION    OFFICE LOCATIONS    Location 1: [facility]  [address], [address]  [place] [address]  Office Number: [phone]    Location 2: [facility]  [address] [place] [address] [address]  [facility], [address]  Office Number: [phone]    Location 3: [facility]  [address] [place] [address]  Office Number: [phone]

## 2024-07-31 ENCOUNTER — ANESTHESIA (OUTPATIENT)
Dept: OPERATING ROOM | Facility: HOSPITAL | Age: 69
End: 2024-07-31
Payer: MEDICARE

## 2024-07-31 ENCOUNTER — APPOINTMENT (OUTPATIENT)
Dept: RADIOLOGY | Facility: HOSPITAL | Age: 69
End: 2024-07-31
Payer: MEDICARE

## 2024-07-31 ENCOUNTER — APPOINTMENT (OUTPATIENT)
Dept: CARDIOLOGY | Facility: HOSPITAL | Age: 69
End: 2024-07-31
Payer: MEDICARE

## 2024-07-31 ENCOUNTER — HOSPITAL ENCOUNTER (OUTPATIENT)
Facility: HOSPITAL | Age: 69
LOS: 1 days | Discharge: HOME | End: 2024-08-01
Attending: ORTHOPAEDIC SURGERY | Admitting: ORTHOPAEDIC SURGERY
Payer: MEDICARE

## 2024-07-31 ENCOUNTER — PHARMACY VISIT (OUTPATIENT)
Dept: PHARMACY | Facility: CLINIC | Age: 69
End: 2024-07-31
Payer: COMMERCIAL

## 2024-07-31 DIAGNOSIS — M16.0 PRIMARY OSTEOARTHRITIS OF BOTH HIPS: Primary | ICD-10-CM

## 2024-07-31 LAB
ABO GROUP (TYPE) IN BLOOD: NORMAL
ATRIAL RATE: 66 BPM
P AXIS: 53 DEGREES
PR INTERVAL: 159 MS
Q ONSET: 249 MS
QRS COUNT: 11 BEATS
QRS DURATION: 114 MS
QT INTERVAL: 459 MS
QTC CALCULATION(BAZETT): 474 MS
QTC FREDERICIA: 468 MS
R AXIS: -23 DEGREES
RH FACTOR (ANTIGEN D): NORMAL
T AXIS: 9 DEGREES
T OFFSET: 479 MS
VENTRICULAR RATE: 64 BPM

## 2024-07-31 PROCEDURE — C1776 JOINT DEVICE (IMPLANTABLE): HCPCS | Performed by: ORTHOPAEDIC SURGERY

## 2024-07-31 PROCEDURE — 72170 X-RAY EXAM OF PELVIS: CPT

## 2024-07-31 PROCEDURE — C1713 ANCHOR/SCREW BN/BN,TIS/BN: HCPCS | Performed by: ORTHOPAEDIC SURGERY

## 2024-07-31 PROCEDURE — 93005 ELECTROCARDIOGRAM TRACING: CPT

## 2024-07-31 PROCEDURE — 1100000001 HC PRIVATE ROOM DAILY

## 2024-07-31 PROCEDURE — 2500000001 HC RX 250 WO HCPCS SELF ADMINISTERED DRUGS (ALT 637 FOR MEDICARE OP): Performed by: ORTHOPAEDIC SURGERY

## 2024-07-31 PROCEDURE — 97110 THERAPEUTIC EXERCISES: CPT | Mod: GP | Performed by: PHYSICAL THERAPIST

## 2024-07-31 PROCEDURE — 7100000002 HC RECOVERY ROOM TIME - EACH INCREMENTAL 1 MINUTE: Performed by: ORTHOPAEDIC SURGERY

## 2024-07-31 PROCEDURE — 3600000018 HC OR TIME - INITIAL BASE CHARGE - PROCEDURE LEVEL SIX: Performed by: ORTHOPAEDIC SURGERY

## 2024-07-31 PROCEDURE — 2500000005 HC RX 250 GENERAL PHARMACY W/O HCPCS: Performed by: NURSE ANESTHETIST, CERTIFIED REGISTERED

## 2024-07-31 PROCEDURE — 2500000004 HC RX 250 GENERAL PHARMACY W/ HCPCS (ALT 636 FOR OP/ED): Performed by: ORTHOPAEDIC SURGERY

## 2024-07-31 PROCEDURE — 27130 TOTAL HIP ARTHROPLASTY: CPT | Performed by: ORTHOPAEDIC SURGERY

## 2024-07-31 PROCEDURE — 2720000007 HC OR 272 NO HCPCS: Performed by: ORTHOPAEDIC SURGERY

## 2024-07-31 PROCEDURE — 2500000004 HC RX 250 GENERAL PHARMACY W/ HCPCS (ALT 636 FOR OP/ED): Mod: JZ | Performed by: ORTHOPAEDIC SURGERY

## 2024-07-31 PROCEDURE — 99221 1ST HOSP IP/OBS SF/LOW 40: CPT | Performed by: NURSE PRACTITIONER

## 2024-07-31 PROCEDURE — 3700000002 HC GENERAL ANESTHESIA TIME - EACH INCREMENTAL 1 MINUTE: Performed by: ORTHOPAEDIC SURGERY

## 2024-07-31 PROCEDURE — A6213 FOAM DRG >16<=48 SQ IN W/BDR: HCPCS | Performed by: ORTHOPAEDIC SURGERY

## 2024-07-31 PROCEDURE — 97161 PT EVAL LOW COMPLEX 20 MIN: CPT | Mod: GP | Performed by: PHYSICAL THERAPIST

## 2024-07-31 PROCEDURE — 3700000001 HC GENERAL ANESTHESIA TIME - INITIAL BASE CHARGE: Performed by: ORTHOPAEDIC SURGERY

## 2024-07-31 PROCEDURE — 7100000001 HC RECOVERY ROOM TIME - INITIAL BASE CHARGE: Performed by: ORTHOPAEDIC SURGERY

## 2024-07-31 PROCEDURE — 2500000004 HC RX 250 GENERAL PHARMACY W/ HCPCS (ALT 636 FOR OP/ED): Performed by: ANESTHESIOLOGY

## 2024-07-31 PROCEDURE — 2500000005 HC RX 250 GENERAL PHARMACY W/O HCPCS: Performed by: ORTHOPAEDIC SURGERY

## 2024-07-31 PROCEDURE — 2780000003 HC OR 278 NO HCPCS: Performed by: ORTHOPAEDIC SURGERY

## 2024-07-31 PROCEDURE — 97116 GAIT TRAINING THERAPY: CPT | Mod: GP | Performed by: PHYSICAL THERAPIST

## 2024-07-31 PROCEDURE — 2500000004 HC RX 250 GENERAL PHARMACY W/ HCPCS (ALT 636 FOR OP/ED): Performed by: NURSE ANESTHETIST, CERTIFIED REGISTERED

## 2024-07-31 PROCEDURE — 2500000001 HC RX 250 WO HCPCS SELF ADMINISTERED DRUGS (ALT 637 FOR MEDICARE OP): Performed by: NURSE PRACTITIONER

## 2024-07-31 PROCEDURE — 93010 ELECTROCARDIOGRAM REPORT: CPT | Performed by: INTERNAL MEDICINE

## 2024-07-31 PROCEDURE — 3600000017 HC OR TIME - EACH INCREMENTAL 1 MINUTE - PROCEDURE LEVEL SIX: Performed by: ORTHOPAEDIC SURGERY

## 2024-07-31 DEVICE — TRIDENT II TRITANIUM CLUSTER 54E
Type: IMPLANTABLE DEVICE | Site: HIP | Status: FUNCTIONAL
Brand: TRIDENT II

## 2024-07-31 DEVICE — 6.5MM LOW PROFILE HEX SCREW 25MM
Type: IMPLANTABLE DEVICE | Site: HIP | Status: FUNCTIONAL
Brand: TRIDENT II

## 2024-07-31 DEVICE — 132 DEGREE NECK ANGLE HIP STEM
Type: IMPLANTABLE DEVICE | Site: HIP | Status: FUNCTIONAL
Brand: ACCOLADE

## 2024-07-31 DEVICE — CERAMIC V40 FEMORAL HEAD
Type: IMPLANTABLE DEVICE | Site: HIP | Status: FUNCTIONAL
Brand: BIOLOX

## 2024-07-31 DEVICE — RESTORATION ADM/MDM X3 INSERT
Type: IMPLANTABLE DEVICE | Site: HIP | Status: FUNCTIONAL
Brand: RESTORATION ADM/MDM X3 INSERT

## 2024-07-31 DEVICE — LINER- CEMENTLESS
Type: IMPLANTABLE DEVICE | Site: HIP | Status: FUNCTIONAL
Brand: MDM

## 2024-07-31 RX ORDER — DIPHENHYDRAMINE HCL 12.5MG/5ML
12.5 LIQUID (ML) ORAL EVERY 6 HOURS PRN
Status: DISCONTINUED | OUTPATIENT
Start: 2024-07-31 | End: 2024-08-01 | Stop reason: HOSPADM

## 2024-07-31 RX ORDER — SODIUM CHLORIDE, SODIUM LACTATE, POTASSIUM CHLORIDE, CALCIUM CHLORIDE 600; 310; 30; 20 MG/100ML; MG/100ML; MG/100ML; MG/100ML
100 INJECTION, SOLUTION INTRAVENOUS CONTINUOUS
Status: ACTIVE | OUTPATIENT
Start: 2024-07-31 | End: 2024-08-01

## 2024-07-31 RX ORDER — OXYCODONE AND ACETAMINOPHEN 5; 325 MG/1; MG/1
1 TABLET ORAL EVERY 4 HOURS PRN
Status: DISCONTINUED | OUTPATIENT
Start: 2024-07-31 | End: 2024-07-31 | Stop reason: HOSPADM

## 2024-07-31 RX ORDER — ONDANSETRON HYDROCHLORIDE 2 MG/ML
4 INJECTION, SOLUTION INTRAVENOUS ONCE AS NEEDED
Status: DISCONTINUED | OUTPATIENT
Start: 2024-07-31 | End: 2024-07-31 | Stop reason: HOSPADM

## 2024-07-31 RX ORDER — VANCOMYCIN HYDROCHLORIDE 1 G/200ML
1 INJECTION, SOLUTION INTRAVENOUS ONCE
Status: COMPLETED | OUTPATIENT
Start: 2024-07-31 | End: 2024-07-31

## 2024-07-31 RX ORDER — SODIUM CHLORIDE, SODIUM LACTATE, POTASSIUM CHLORIDE, CALCIUM CHLORIDE 600; 310; 30; 20 MG/100ML; MG/100ML; MG/100ML; MG/100ML
100 INJECTION, SOLUTION INTRAVENOUS CONTINUOUS
Status: DISCONTINUED | OUTPATIENT
Start: 2024-07-31 | End: 2024-07-31 | Stop reason: SDUPTHER

## 2024-07-31 RX ORDER — SENNOSIDES 8.6 MG/1
2 TABLET ORAL 2 TIMES DAILY
Status: DISCONTINUED | OUTPATIENT
Start: 2024-07-31 | End: 2024-08-01 | Stop reason: HOSPADM

## 2024-07-31 RX ORDER — ACETAMINOPHEN 325 MG/1
650 TABLET ORAL EVERY 6 HOURS SCHEDULED
Status: DISCONTINUED | OUTPATIENT
Start: 2024-07-31 | End: 2024-08-01 | Stop reason: HOSPADM

## 2024-07-31 RX ORDER — ONDANSETRON HYDROCHLORIDE 2 MG/ML
4 INJECTION, SOLUTION INTRAVENOUS EVERY 8 HOURS PRN
Status: DISCONTINUED | OUTPATIENT
Start: 2024-07-31 | End: 2024-08-01 | Stop reason: HOSPADM

## 2024-07-31 RX ORDER — ROPIVACAINE/EPI/CLONIDINE/KET 2.46-0.005
SYRINGE (ML) INJECTION AS NEEDED
Status: DISCONTINUED | OUTPATIENT
Start: 2024-07-31 | End: 2024-07-31 | Stop reason: HOSPADM

## 2024-07-31 RX ORDER — LABETALOL HYDROCHLORIDE 5 MG/ML
5 INJECTION, SOLUTION INTRAVENOUS ONCE AS NEEDED
Status: DISCONTINUED | OUTPATIENT
Start: 2024-07-31 | End: 2024-07-31 | Stop reason: HOSPADM

## 2024-07-31 RX ORDER — MEPERIDINE HYDROCHLORIDE 25 MG/ML
12.5 INJECTION INTRAMUSCULAR; INTRAVENOUS; SUBCUTANEOUS EVERY 10 MIN PRN
Status: DISCONTINUED | OUTPATIENT
Start: 2024-07-31 | End: 2024-07-31 | Stop reason: HOSPADM

## 2024-07-31 RX ORDER — DROPERIDOL 2.5 MG/ML
0.62 INJECTION, SOLUTION INTRAMUSCULAR; INTRAVENOUS ONCE AS NEEDED
Status: DISCONTINUED | OUTPATIENT
Start: 2024-07-31 | End: 2024-07-31 | Stop reason: HOSPADM

## 2024-07-31 RX ORDER — ACETAMINOPHEN 325 MG/1
975 TABLET ORAL ONCE
Status: COMPLETED | OUTPATIENT
Start: 2024-07-31 | End: 2024-07-31

## 2024-07-31 RX ORDER — DIPHENHYDRAMINE HYDROCHLORIDE 50 MG/ML
12.5 INJECTION INTRAMUSCULAR; INTRAVENOUS ONCE AS NEEDED
Status: DISCONTINUED | OUTPATIENT
Start: 2024-07-31 | End: 2024-07-31 | Stop reason: HOSPADM

## 2024-07-31 RX ORDER — HYDRALAZINE HYDROCHLORIDE 20 MG/ML
5 INJECTION INTRAMUSCULAR; INTRAVENOUS EVERY 30 MIN PRN
Status: DISCONTINUED | OUTPATIENT
Start: 2024-07-31 | End: 2024-07-31 | Stop reason: HOSPADM

## 2024-07-31 RX ORDER — BUPIVACAINE HYDROCHLORIDE 7.5 MG/ML
INJECTION, SOLUTION EPIDURAL; RETROBULBAR AS NEEDED
Status: DISCONTINUED | OUTPATIENT
Start: 2024-07-31 | End: 2024-07-31

## 2024-07-31 RX ORDER — TRANEXAMIC ACID 100 MG/ML
1000 INJECTION, SOLUTION INTRAVENOUS 2 TIMES DAILY
Status: DISCONTINUED | OUTPATIENT
Start: 2024-07-31 | End: 2024-07-31 | Stop reason: HOSPADM

## 2024-07-31 RX ORDER — METOCLOPRAMIDE HYDROCHLORIDE 5 MG/ML
10 INJECTION INTRAMUSCULAR; INTRAVENOUS EVERY 6 HOURS PRN
Status: DISCONTINUED | OUTPATIENT
Start: 2024-07-31 | End: 2024-08-01 | Stop reason: HOSPADM

## 2024-07-31 RX ORDER — CEFAZOLIN SODIUM 2 G/100ML
2 INJECTION, SOLUTION INTRAVENOUS ONCE
Status: COMPLETED | OUTPATIENT
Start: 2024-07-31 | End: 2024-07-31

## 2024-07-31 RX ORDER — ATORVASTATIN CALCIUM 40 MG/1
40 TABLET, FILM COATED ORAL NIGHTLY
Status: DISCONTINUED | OUTPATIENT
Start: 2024-07-31 | End: 2024-08-01 | Stop reason: HOSPADM

## 2024-07-31 RX ORDER — METOCLOPRAMIDE 10 MG/1
10 TABLET ORAL EVERY 6 HOURS PRN
Status: DISCONTINUED | OUTPATIENT
Start: 2024-07-31 | End: 2024-08-01 | Stop reason: HOSPADM

## 2024-07-31 RX ORDER — ALBUTEROL SULFATE 0.83 MG/ML
2.5 SOLUTION RESPIRATORY (INHALATION) ONCE AS NEEDED
Status: DISCONTINUED | OUTPATIENT
Start: 2024-07-31 | End: 2024-07-31 | Stop reason: HOSPADM

## 2024-07-31 RX ORDER — ONDANSETRON 4 MG/1
4 TABLET, ORALLY DISINTEGRATING ORAL EVERY 8 HOURS PRN
Status: DISCONTINUED | OUTPATIENT
Start: 2024-07-31 | End: 2024-08-01 | Stop reason: HOSPADM

## 2024-07-31 RX ORDER — VANCOMYCIN HYDROCHLORIDE 1 G/20ML
1 INJECTION, POWDER, LYOPHILIZED, FOR SOLUTION INTRAVENOUS ONCE
Status: COMPLETED | OUTPATIENT
Start: 2024-07-31 | End: 2024-07-31

## 2024-07-31 RX ORDER — MIDAZOLAM HYDROCHLORIDE 1 MG/ML
INJECTION, SOLUTION INTRAMUSCULAR; INTRAVENOUS AS NEEDED
Status: DISCONTINUED | OUTPATIENT
Start: 2024-07-31 | End: 2024-07-31

## 2024-07-31 RX ORDER — SCOLOPAMINE TRANSDERMAL SYSTEM 1 MG/1
1 PATCH, EXTENDED RELEASE TRANSDERMAL
Status: DISCONTINUED | OUTPATIENT
Start: 2024-07-31 | End: 2024-08-01 | Stop reason: HOSPADM

## 2024-07-31 RX ORDER — CELECOXIB 200 MG/1
400 CAPSULE ORAL ONCE
Status: COMPLETED | OUTPATIENT
Start: 2024-07-31 | End: 2024-07-31

## 2024-07-31 RX ORDER — MORPHINE SULFATE 2 MG/ML
2 INJECTION, SOLUTION INTRAMUSCULAR; INTRAVENOUS EVERY 5 MIN PRN
Status: DISCONTINUED | OUTPATIENT
Start: 2024-07-31 | End: 2024-07-31 | Stop reason: HOSPADM

## 2024-07-31 RX ORDER — ASPIRIN 81 MG/1
81 TABLET ORAL 2 TIMES DAILY
Status: DISCONTINUED | OUTPATIENT
Start: 2024-07-31 | End: 2024-08-01 | Stop reason: HOSPADM

## 2024-07-31 RX ORDER — CEFAZOLIN SODIUM 2 G/100ML
2 INJECTION, SOLUTION INTRAVENOUS EVERY 8 HOURS
Status: COMPLETED | OUTPATIENT
Start: 2024-07-31 | End: 2024-08-01

## 2024-07-31 RX ORDER — LIDOCAINE HYDROCHLORIDE 10 MG/ML
0.1 INJECTION, SOLUTION EPIDURAL; INFILTRATION; INTRACAUDAL; PERINEURAL ONCE
Status: DISCONTINUED | OUTPATIENT
Start: 2024-07-31 | End: 2024-07-31 | Stop reason: HOSPADM

## 2024-07-31 RX ORDER — BISACODYL 5 MG
10 TABLET, DELAYED RELEASE (ENTERIC COATED) ORAL DAILY PRN
Status: DISCONTINUED | OUTPATIENT
Start: 2024-07-31 | End: 2024-08-01 | Stop reason: HOSPADM

## 2024-07-31 RX ORDER — PROPOFOL 10 MG/ML
INJECTION, EMULSION INTRAVENOUS CONTINUOUS PRN
Status: DISCONTINUED | OUTPATIENT
Start: 2024-07-31 | End: 2024-07-31

## 2024-07-31 RX ORDER — KETOROLAC TROMETHAMINE 30 MG/ML
15 INJECTION, SOLUTION INTRAMUSCULAR; INTRAVENOUS EVERY 6 HOURS
Status: COMPLETED | OUTPATIENT
Start: 2024-07-31 | End: 2024-08-01

## 2024-07-31 RX ORDER — ONDANSETRON HYDROCHLORIDE 2 MG/ML
INJECTION, SOLUTION INTRAVENOUS AS NEEDED
Status: DISCONTINUED | OUTPATIENT
Start: 2024-07-31 | End: 2024-07-31

## 2024-07-31 RX ORDER — CYCLOBENZAPRINE HCL 10 MG
5 TABLET ORAL 3 TIMES DAILY PRN
Status: DISCONTINUED | OUTPATIENT
Start: 2024-07-31 | End: 2024-08-01 | Stop reason: HOSPADM

## 2024-07-31 RX ORDER — NALOXONE HYDROCHLORIDE 0.4 MG/ML
0.2 INJECTION, SOLUTION INTRAMUSCULAR; INTRAVENOUS; SUBCUTANEOUS EVERY 5 MIN PRN
Status: DISCONTINUED | OUTPATIENT
Start: 2024-07-31 | End: 2024-08-01 | Stop reason: HOSPADM

## 2024-07-31 RX ORDER — OXYCODONE HCL 10 MG/1
10 TABLET, FILM COATED, EXTENDED RELEASE ORAL ONCE
Status: COMPLETED | OUTPATIENT
Start: 2024-07-31 | End: 2024-07-31

## 2024-07-31 RX ORDER — OXYCODONE HYDROCHLORIDE 10 MG/1
10 TABLET ORAL EVERY 4 HOURS PRN
Status: DISCONTINUED | OUTPATIENT
Start: 2024-07-31 | End: 2024-08-01 | Stop reason: HOSPADM

## 2024-07-31 RX ORDER — DONEPEZIL HYDROCHLORIDE 5 MG/1
5 TABLET, FILM COATED ORAL NIGHTLY
Status: DISCONTINUED | OUTPATIENT
Start: 2024-07-31 | End: 2024-08-01 | Stop reason: HOSPADM

## 2024-07-31 RX ORDER — PANTOPRAZOLE SODIUM 40 MG/1
40 TABLET, DELAYED RELEASE ORAL
Status: DISCONTINUED | OUTPATIENT
Start: 2024-08-01 | End: 2024-08-01 | Stop reason: HOSPADM

## 2024-07-31 RX ORDER — FAMOTIDINE 10 MG/ML
20 INJECTION INTRAVENOUS ONCE
Status: COMPLETED | OUTPATIENT
Start: 2024-07-31 | End: 2024-07-31

## 2024-07-31 RX ORDER — OXYCODONE HYDROCHLORIDE 5 MG/1
5 TABLET ORAL EVERY 4 HOURS PRN
Status: DISCONTINUED | OUTPATIENT
Start: 2024-07-31 | End: 2024-08-01 | Stop reason: HOSPADM

## 2024-07-31 RX ORDER — DIPHENHYDRAMINE HYDROCHLORIDE 50 MG/ML
12.5 INJECTION INTRAMUSCULAR; INTRAVENOUS EVERY 6 HOURS PRN
Status: DISCONTINUED | OUTPATIENT
Start: 2024-07-31 | End: 2024-08-01 | Stop reason: HOSPADM

## 2024-07-31 RX ORDER — TRANEXAMIC ACID 100 MG/ML
INJECTION, SOLUTION INTRAVENOUS AS NEEDED
Status: DISCONTINUED | OUTPATIENT
Start: 2024-07-31 | End: 2024-07-31

## 2024-07-31 RX ORDER — SODIUM CHLORIDE, SODIUM LACTATE, POTASSIUM CHLORIDE, CALCIUM CHLORIDE 600; 310; 30; 20 MG/100ML; MG/100ML; MG/100ML; MG/100ML
100 INJECTION, SOLUTION INTRAVENOUS CONTINUOUS
Status: DISCONTINUED | OUTPATIENT
Start: 2024-07-31 | End: 2024-07-31 | Stop reason: HOSPADM

## 2024-07-31 RX ORDER — FENTANYL CITRATE 50 UG/ML
INJECTION, SOLUTION INTRAMUSCULAR; INTRAVENOUS AS NEEDED
Status: DISCONTINUED | OUTPATIENT
Start: 2024-07-31 | End: 2024-07-31

## 2024-07-31 RX ORDER — VANCOMYCIN HYDROCHLORIDE 1 G/20ML
INJECTION, POWDER, LYOPHILIZED, FOR SOLUTION INTRAVENOUS AS NEEDED
Status: DISCONTINUED | OUTPATIENT
Start: 2024-07-31 | End: 2024-07-31 | Stop reason: HOSPADM

## 2024-07-31 SDOH — SOCIAL STABILITY: SOCIAL INSECURITY: ARE THERE ANY APPARENT SIGNS OF INJURIES/BEHAVIORS THAT COULD BE RELATED TO ABUSE/NEGLECT?: NO

## 2024-07-31 SDOH — SOCIAL STABILITY: SOCIAL INSECURITY: ABUSE: ADULT

## 2024-07-31 SDOH — SOCIAL STABILITY: SOCIAL INSECURITY: DOES ANYONE TRY TO KEEP YOU FROM HAVING/CONTACTING OTHER FRIENDS OR DOING THINGS OUTSIDE YOUR HOME?: NO

## 2024-07-31 SDOH — HEALTH STABILITY: MENTAL HEALTH: CURRENT SMOKER: 0

## 2024-07-31 SDOH — SOCIAL STABILITY: SOCIAL INSECURITY: DO YOU FEEL ANYONE HAS EXPLOITED OR TAKEN ADVANTAGE OF YOU FINANCIALLY OR OF YOUR PERSONAL PROPERTY?: NO

## 2024-07-31 SDOH — SOCIAL STABILITY: SOCIAL INSECURITY: ARE YOU OR HAVE YOU BEEN THREATENED OR ABUSED PHYSICALLY, EMOTIONALLY, OR SEXUALLY BY ANYONE?: NO

## 2024-07-31 SDOH — SOCIAL STABILITY: SOCIAL INSECURITY: HAVE YOU HAD THOUGHTS OF HARMING ANYONE ELSE?: NO

## 2024-07-31 SDOH — SOCIAL STABILITY: SOCIAL INSECURITY: HAS ANYONE EVER THREATENED TO HURT YOUR FAMILY OR YOUR PETS?: NO

## 2024-07-31 SDOH — SOCIAL STABILITY: SOCIAL INSECURITY: WERE YOU ABLE TO COMPLETE ALL THE BEHAVIORAL HEALTH SCREENINGS?: YES

## 2024-07-31 SDOH — SOCIAL STABILITY: SOCIAL INSECURITY: DO YOU FEEL UNSAFE GOING BACK TO THE PLACE WHERE YOU ARE LIVING?: NO

## 2024-07-31 ASSESSMENT — PAIN SCALES - GENERAL
PAINLEVEL_OUTOF10: 0 - NO PAIN
PAINLEVEL_OUTOF10: 7
PAINLEVEL_OUTOF10: 5 - MODERATE PAIN
PAINLEVEL_OUTOF10: 0 - NO PAIN
PAINLEVEL_OUTOF10: 7
PAINLEVEL_OUTOF10: 0 - NO PAIN
PAINLEVEL_OUTOF10: 8
PAIN_LEVEL: 5
PAINLEVEL_OUTOF10: 7

## 2024-07-31 ASSESSMENT — ACTIVITIES OF DAILY LIVING (ADL)
TOILETING: INDEPENDENT
HEARING - RIGHT EAR: HEARING AID
FEEDING YOURSELF: INDEPENDENT
DRESSING YOURSELF: INDEPENDENT
PATIENT'S MEMORY ADEQUATE TO SAFELY COMPLETE DAILY ACTIVITIES?: YES
LACK_OF_TRANSPORTATION: NO
GROOMING: INDEPENDENT
BATHING: INDEPENDENT
JUDGMENT_ADEQUATE_SAFELY_COMPLETE_DAILY_ACTIVITIES: YES
ADEQUATE_TO_COMPLETE_ADL: YES
WALKS IN HOME: INDEPENDENT
HEARING - LEFT EAR: HEARING AID

## 2024-07-31 ASSESSMENT — COGNITIVE AND FUNCTIONAL STATUS - GENERAL
WALKING IN HOSPITAL ROOM: A LITTLE
MOVING TO AND FROM BED TO CHAIR: A LITTLE
MOBILITY SCORE: 20
CLIMB 3 TO 5 STEPS WITH RAILING: A LOT
WALKING IN HOSPITAL ROOM: A LITTLE
DAILY ACTIVITIY SCORE: 24
MOBILITY SCORE: 20
WALKING IN HOSPITAL ROOM: A LITTLE
TURNING FROM BACK TO SIDE WHILE IN FLAT BAD: A LITTLE
MOBILITY SCORE: 20
MOVING TO AND FROM BED TO CHAIR: A LITTLE
DAILY ACTIVITIY SCORE: 24
STANDING UP FROM CHAIR USING ARMS: A LITTLE
CLIMB 3 TO 5 STEPS WITH RAILING: A LOT
WALKING IN HOSPITAL ROOM: A LITTLE
CLIMB 3 TO 5 STEPS WITH RAILING: A LOT
MOVING TO AND FROM BED TO CHAIR: A LITTLE
CLIMB 3 TO 5 STEPS WITH RAILING: A LOT
MOVING FROM LYING ON BACK TO SITTING ON SIDE OF FLAT BED WITH BEDRAILS: A LITTLE
DAILY ACTIVITIY SCORE: 24
MOVING TO AND FROM BED TO CHAIR: A LITTLE
MOBILITY SCORE: 17
PATIENT BASELINE BEDBOUND: NO

## 2024-07-31 ASSESSMENT — LIFESTYLE VARIABLES
HOW OFTEN DO YOU HAVE 6 OR MORE DRINKS ON ONE OCCASION: NEVER
HOW MANY STANDARD DRINKS CONTAINING ALCOHOL DO YOU HAVE ON A TYPICAL DAY: PATIENT DOES NOT DRINK
HOW OFTEN DO YOU HAVE A DRINK CONTAINING ALCOHOL: NEVER
AUDIT-C TOTAL SCORE: 0
AUDIT-C TOTAL SCORE: 0
SKIP TO QUESTIONS 9-10: 1

## 2024-07-31 ASSESSMENT — PAIN - FUNCTIONAL ASSESSMENT
PAIN_FUNCTIONAL_ASSESSMENT: 0-10

## 2024-07-31 ASSESSMENT — PATIENT HEALTH QUESTIONNAIRE - PHQ9
2. FEELING DOWN, DEPRESSED OR HOPELESS: NOT AT ALL
1. LITTLE INTEREST OR PLEASURE IN DOING THINGS: NOT AT ALL
SUM OF ALL RESPONSES TO PHQ9 QUESTIONS 1 & 2: 0

## 2024-07-31 ASSESSMENT — COLUMBIA-SUICIDE SEVERITY RATING SCALE - C-SSRS
6. HAVE YOU EVER DONE ANYTHING, STARTED TO DO ANYTHING, OR PREPARED TO DO ANYTHING TO END YOUR LIFE?: NO
2. HAVE YOU ACTUALLY HAD ANY THOUGHTS OF KILLING YOURSELF?: NO
1. IN THE PAST MONTH, HAVE YOU WISHED YOU WERE DEAD OR WISHED YOU COULD GO TO SLEEP AND NOT WAKE UP?: NO

## 2024-07-31 ASSESSMENT — PAIN DESCRIPTION - LOCATION: LOCATION: HIP

## 2024-07-31 NOTE — CARE PLAN
The patient's goals for the shift include      The clinical goals for the shift include Patient will remain safe and free from falls during this shift      Problem: Fall/Injury  Goal: Not fall by end of shift  Outcome: Progressing  Goal: Be free from injury by end of the shift  Outcome: Progressing  Goal: Verbalize understanding of personal risk factors for fall in the hospital  Outcome: Progressing  Goal: Verbalize understanding of risk factor reduction measures to prevent injury from fall in the home  Outcome: Progressing  Goal: Use assistive devices by end of the shift  Outcome: Progressing  Goal: Pace activities to prevent fatigue by end of the shift  Outcome: Progressing     Problem: Pain - Adult  Goal: Verbalizes/displays adequate comfort level or baseline comfort level  Outcome: Progressing     Problem: Safety - Adult  Goal: Free from fall injury  Outcome: Progressing     Problem: Discharge Planning  Goal: Discharge to home or other facility with appropriate resources  Outcome: Progressing     Problem: Chronic Conditions and Co-morbidities  Goal: Patient's chronic conditions and co-morbidity symptoms are monitored and maintained or improved  Outcome: Progressing

## 2024-07-31 NOTE — ANESTHESIA PREPROCEDURE EVALUATION
Patient: Osmar Pond    Procedure Information       Date/Time: 07/31/24 0730    Procedure: RIGHT Total Hip Arthroplasty *23 hr obs* Tuyet * (Modular Dual Mobility) Spinal (Right: Hip) - SCHEDULE: 2 HOURS, 23 hr obs, Tuyet Modular Dual Mobility, Spinal    Location: [facility] / Virtual [facility]    Surgeons: Kanika Hallman, DO            Relevant Problems   Anesthesia (within normal limits)      Cardiac   (+) Abnormal EKG   (+) Hyperlipidemia   (+) Hypertension   (+) Hypertriglyceridemia   (+) Multiple vessel coronary artery disease   (+) PVC (premature ventricular contraction)   (+) Supraventricular tachycardia, paroxysmal (CMS-HCC)   (+) Ventricular tachycardia (paroxysmal) (Multi)      Pulmonary   (+) Obstructive sleep apnea syndrome      Neuro   (+) Anxiety      /Renal   (+) Benign prostatic hyperplasia with urinary obstruction      Musculoskeletal   (+) Primary osteoarthritis of both hips      HEENT   (+) Acute sinusitis   (+) Asymmetrical sensorineural hearing loss   (+) Disorder of paranasal sinus   (+) Sensorineural hearing loss, bilateral       Clinical information reviewed:   Tobacco  Allergies  Meds   Med Hx  Surg Hx   Fam Hx  Soc Hx        NPO Detail:  NPO/Void Status  Date of Last Liquid: 07/30/24  Time of Last Liquid: 1900  Date of Last Solid: 07/30/24  Time of Last Solid: 1900         Physical Exam    Airway  Mallampati: II  TM distance: >3 FB  Neck ROM: full     Cardiovascular - normal exam     Dental   Comments: Partials upper land lower   Pulmonary - normal exam     Abdominal - normal exam         Anesthesia Plan    History of general anesthesia?: yes  History of complications of general anesthesia?: no    ASA 3     general and spinal     The patient is not a current smoker.    intravenous induction   Postoperative administration of opioids is intended.  Anesthetic plan and risks discussed with patient.  Use of blood products discussed with patient who.    Plan discussed with CRNA.

## 2024-07-31 NOTE — CONSULTS
Inpatient consult to Medicine  Consult performed by: VICENTE Wilks-CNP  Consult ordered by: Kanika Hallman DO  Reason for consult: medical management      History Of Present Illness  Osmar Pond is a 69 y.o. male with PMHx of arthritis who is POD #0 RIGHT Total Hip Arthroplasty.  At the bedside he denies pain, nausea and dyspnea. Last BM was yesterday. Family at bedside. Remainder of ROS reviewed and negative except as indicated in HPI.     Objective  Past Medical History  He has a past medical history of Anxiety, Cognitive disorder, Coronary artery disease, HLD (hyperlipidemia), PSVT (paroxysmal supraventricular tachycardia) (CMS-[facility]), and Vertigo.    Surgical History  He has a past surgical history that includes Other surgical history (11/05/2018) and Coronary artery bypass graft (2021).    Social History     Tobacco Use    Smoking status: Former     Types: Cigarettes    Smokeless tobacco: Never   Vaping Use    Vaping status: Never Used   Substance Use Topics    Alcohol use: Not Currently    Drug use: Never       Family History   Problem Relation Name Age of Onset    Dementia Mother      No Known Problems Father         Patient has no known allergies.    Vitals:    07/31/24 1044   BP: 146/78   Pulse: 50   Resp: 15   Temp: 35.9 °C (96.7 °F)   SpO2: 94%       Vitals:    07/31/24 1041   Weight: 91.5 kg (201 lb 12.8 oz)       Scheduled medications  acetaminophen, 650 mg, oral, q6h EVELYN  aspirin, 81 mg, oral, BID  atorvastatin, 40 mg, oral, Nightly  ceFAZolin, 2 g, intravenous, q8h  donepezil, 5 mg, oral, Nightly  ketorolac, 15 mg, intravenous, q6h  [START ON 8/1/2024] pantoprazole, 40 mg, oral, Daily before breakfast  scopolamine, 1 patch, transdermal, q72h  sennosides, 2 tablet, oral, BID      Continuous medications  lactated Ringer's, 100 mL/hr, Last Rate: 100 mL/hr (07/31/24 1119)  oxygen, 2 L/min, Last Rate: Stopped (07/31/24 1100)      PRN medications  PRN medications: bisacodyl, cyclobenzaprine,  diphenhydrAMINE, diphenhydrAMINE, metoclopramide **OR** metoclopramide, naloxone, naloxone, ondansetron ODT **OR** ondansetron, oxyCODONE, oxyCODONE    Results for orders placed or performed during the hospital encounter of 07/31/24 (from the past 24 hour(s))   VERIFY ABO/Rh Group Test   Result Value Ref Range    ABO TYPE B     Rh TYPE POS        I personally reviewed all pertinent labwork, imaging and vital signs, as well as medications, nursing, therapy, discharge planning and consult notes.     Constitutional: Well developed, awake, alert, calm, oriented x4, no acute distress, cooperative   Eyes: EOMI, clear sclera   ENMT: mucous membranes moist, no lesions seen   Head/Neck: Neck supple, no apparent injury, head atraumatic   Respiratory/Thorax: CTAB, good chest expansion, respirations even and unlabored   Cardiovascular: Irregular rate and rhythm, no murmurs/rubs/gallops, normal S1 and S 2   Gastrointestinal: Abdomen nondistended, soft, nontender, +BS, no bruits   Musculoskeletal: ROM intact, no joint swelling, normal  strength, RLE deferred   Extremities: no cyanosis, edema, contusions or clubbing   Neurological: no focal deficit, pt alert and oriented x4   Psychological: Appropriate affect and behavior, pleasant   Skin: Warm and dry, no lesions, no rashes       Assessment/Plan  1. Hx arthritis POD #0 RIGHT Total Hip Arthroplasty  Management per primary surgery service   Continue pain and bowel regimen, pt advised to stay ahead of pain    2. CAD s/p CABG x4 2021  Continue ASA and statin    3. Short-term memory loss  Resume Aricept    4. HLD  Resume atorvastatin    5. Arrhythmia  HR irregular on exam, no hx, pt asymptomatic, will check EKG    6. DVT ppx  BASA BID per ortho surgery protocol      Jolie Hansen, CNP  Hospital Medicine

## 2024-07-31 NOTE — PROGRESS NOTES
Osmar Pond is a 69 y.o. male admitted for Primary osteoarthritis of both hips. Pharmacy reviewed the patient's [id number] medications and allergies for accuracy.    The list below reflects the PTA list prior to pharmacy medication history. A summary a changes to the PTA medication list has been listed below. Please review each medication in order reconciliation for additional clarification and justification.    Source of information:  Pharmacy and medlist    Medications added:    Medications modified:    Medications to be removed:  Apap 650mg    Medications of concern:      Prior to Admission Medications   Prescriptions Last Dose Informant Patient Reported? Taking?   acetaminophen (Tylenol 8 HOUR) 650 mg ER tablet Not Taking  Yes No   Sig: Take 1 tablet (650 mg) by mouth every 8 hours if needed for mild pain (1 - 3). Do not crush, chew, or split.  Takes 2 at hs prn   aspirin 81 mg EC tablet 7/30/2024  Yes Yes   Sig: Take 1 tablet (81 mg) by mouth once daily. As directed   atorvastatin (Lipitor) 40 mg tablet 7/30/2024  No Yes   Sig: Take 1 tablet (40 mg) by mouth once daily.   chlorhexidine (Peridex) 0.12 % solution 7/31/2024  No Yes   Sig: Swish and Spit 15 ml the night before and the morning of surgery. (2 Doses)   cholecalciferol (Vitamin D3) 50 MCG (2000 UT) tablet Past Week  Yes Yes   Sig: Take 1 tablet (50 mcg) by mouth once daily.   cyanocobalamin (Vitamin B-12) 1,000 mcg tablet Past Week  Yes Yes   Sig: Take 1 tablet (1,000 mcg) by mouth once daily. Takes 5000 mcg   donepezil (Aricept) 5 mg tablet 7/30/2024  No Yes   Sig: Take 1 tablet (5 mg) by mouth once daily at bedtime.   multivitamin (Daily Multi-Vitamin) tablet Past Week  Yes Yes   Sig: Take by mouth once daily.      Facility-Administered Medications: None       Karissa Daniel

## 2024-07-31 NOTE — OP NOTE
Date: 2024   OR Location: [facility]    Name: Osmar Pond  : 1955    MRN: [id number]    Diagnosis  Pre-op Diagnosis      * Primary osteoarthritis of both hips [M16.0]  Post-op Diagnosis     * Primary osteoarthritis of both hips [M16.0]      Procedures  RIGHT Total Hip Arthroplasty *23 hr obs* [place] * (Modular Dual Mobility) Spinal  14453 - MA ARTHRP ACETBLR/PROX FEM PROSTC AGRFT/ALGRFT      Surgeons      * Kanika Hallman - Primary     Specimen: none    Staff: Circulator: Cat Sutton RN  Scrub Person: Haylie Monzon     Drains and/or Catheters: none    Estimated Blood Loss: 75 cc    Resident/Fellow/Other Assistant:  Surgeons and Role:  * No surgeons found with a matching role *     Procedure Summary  Anesthesia: Spinal    Anesthesia Staff: CRNA: VICENTE Juarez-CRNA  SRNA: Jeremias Kim RN   ASA: III       Intra-op Medications:   - 1 Gram Tranexamic acid prior to surgical incision  - 1 Gram Tranexamic acid at start of incision close  - JARVIS Pain cockail: ropivacaine-epinephrine-clonidine-ketorolac 2.46-0.005- 0.0008-0.3mg/mL periarticular syringe: 50 cc    IMPLANTS:   Implant Name Type Inv. Item Serial No.  Lot No. LRB No. Used Action   SCREW, LOW PROFILE HEX, 6.5 X 25 MM - [id number] Screw SCREW, LOW PROFILE HEX, 6.5 X 25 MM  [employer] [id number] Right 1 Implanted   SHELL, TRIDENT II, CLUSTERHOLE, 54E - [id number] Joint SHELL, TRIDENT II, CLUSTERHOLE, 54E  [employer] [id number] Right 1 Implanted   LINER, COCR, MDM, 42MM E - [id number] Joint Hip LINER, COCR, MDM, 42MM E  [employer] Side.Cr [id number] Right 1 Implanted   STEM, FEMUR 132D SZ 5 ACCOLADE II - [id number] Joint STEM, FEMUR 132D SZ 5 ACCOLADE II  [employer] Side.Cr [id number] Right 1 Implanted   HEAD, FEMUR V40 28MM +4MM BIOLOX DELTA - [id number] Joint HEAD, FEMUR V40 28MM +4MM BIOLOX DELTA  "[employer] [id number] Right 1 Implanted   INSERT, MDM X3  RESTORATION, 42OD 28ID X 6.9MM - [id number] Joint INSERT, MDM X3 RESTORATION, 42OD 28ID X 6.9MM  [employer] [id number] Right 1 Implanted       Findings: See operative note    Operative Indications:  Osmar Pond is a patient that is well-known to me and initially presented as an outpatient. They have been treated for advanced hip osteoarthritis with therapy, anti-inflammatories, and activity modification, all without improvement of symptoms. They are here for surgery for Pre-op Diagnosis      * Primary osteoarthritis of both hips [M16.0].     We therefore reviewed the alternative option of elective total hip arthroplasty. The risks and benefits of this procedure were discussed in detail as an outpatient and are documented in our outpatient record. The patient expressed full understanding and wished to proceed. Informed consent was obtained and was placed on the medical chart    The risks, benefits and potential complications of the arthroplasty surgery were discussed with the patient in detail.  Risks including but not limited to the risk of anesthesia, DVT, pulmonary embolism with the possibility of death, retained infection, and neurovascular injury.  Specific details of the procedure, hospitalization, recovery, rehabilitation, and long-term precautions were also provided. Pre-operative teaching was provided. Implant/prosthesis selection was outlined, and the many options available were explained; the final choice will be made at the time of the procedure to match the anatomy and condition of the bone, ligaments, tendons, and muscles. Understanding of all topics was conveyed to me by the patient, and consent was given to proceed with a total hip arthroplasty.     We specifically discussed the use of a dual mobility component due to history of alzheimers to decrease risk of non compliance with post operative hip precautions .    On the day of surgery the site of surgery was properly  noted/marked if necessary per policy. The patient has been actively warmed in preoperative area. Preoperative antibiotics were confirmed and started prior to surgical incision. Venous thrombosis prophylaxis have been ordered including bilateral sequential compression devices to start in pre-operative area.     Procedure In Detail:  The patient was identified in the preoperative area .Their hip was then marked by myself and the patient agreed to proceed with the outlined procedure.. They were transferred to the operating room and positioned supine on the OR table. Appropriate surgical huddle was performed with myself present. Anesthesia was then successfully induced. The patient was then positioned in the lateral decubitus position on a pegboard. All bony prominences were well-padded. The operative lower extremity was then prepped and draped in the normal sterile fashion. A critical pause was taken and we identified the patient, the site of surgery, as well as the administration of preoperative IV antibiotics. The limb was then positioned neutral on a padded Mireles stand and bony landmarks were identified on the skin.    A posterior- superior hip incision was then performed with the #10 blade scalpel and a minimally invasive direct superior approach was made. The subcutaneous tissues were developed down to the level of the fascia. Electrocautery was used to achieve hemostasis. The fibers of the gluteus татьяна were split bluntly just short of the level of the fascia jasson.The exposed pericapsular fat was removed with electrocautery. The interval between the gluteus medius and the piriformis tendon was then developed. The conjoint tendon was isolated and released from its insertion on the trochanter, tagged and retracted posteriorly, protecting the sciatic nerve through the remainder of the case. The gluteus minimus was elevated from the capsule and a capsulotomy was then performed. Intracapsular retractors were  positioned around the femoral neck and the hip was dislocated posteriorly. The dislocated femoral head was noted to be eburnated over the majority of its surface with circumferential head and neck junction osteophytes. Using a saw, a provisional femoral neck osteotomy was then performed at a level based on the preoperative template. The femoral head was excised. The limb was then positioned neutral on a Mireles stand and I proceeded with acetabular exposure.     A Preeti-type retractor was placed over the anterior column and an inferior capsular retractor was positioned below the acetabular teardrop. The acetabulum was prepared with hemispherical reamers. Starting with a size 45 mm reamer, the acetabular bed was medialized to the level of the medial wall. Reamers were then directed posteriorly and superiorly into the sclerotic subchondral bone. Once good, bleeding cancellous bone was encountered in all four quadrants we stopped reaming. The corresponding size of the last reamer was used to select a  Trident II hemispherical shell. This was then opened and impacted in approximately 40 degrees of abduction and 20 degrees of anteversion. One cancellous bone screw was placed in the posterior superior quadrant and the dual mobility liner was then impacted into the shell. The retractors were then removed and attention was drawn towards the femur.     The femur was delivered into the wound and a box chisel was placed into the piriformis fossa. A canal awl was placed down the canal without resistance. Using the Accolade Broach System, the femur was prepared to accept a broach with good fill and rotational stability. With this in place, there was good rotational and axial stability. The broach was noted to seat at the level of the calcar resection. No fractures were identified. Multiple trial dual mobility head and neck combinations were then made and the reduced hip was stable to 90 degrees of flexion, 70 degrees of internal  rotation, and 20 degrees of adduction. The hip could be fully extended, externally rotated, and abducted without any anterior dislocation. The leg lengths were restored to appropriate length. Intraoperative radiographs were then obtained which demonstrated satisfactory positioning of the components. No fractures were identified.     Satisfied with the reconstruction, the hip was dislocated and the femoral trials were removed. The final femoral stem and dual mobility head were then impacted without complication. The hip was reduced one final time and all of the tissues were thoroughly irrigated. An anesthetic injection cocktail was infiltrated throughout the soft tissues. The capsule was repaired anatomically with #1 Vicryl suture. The conjoint tendon was approximated to the posterior edge of the trochanter with #1 Vicryl suture and the gluteal fascia was repaired with interrupted suture. The rest of the incision was closed in layers with a final layer of monocryl and skin glue. Occlusive dressing was then applied.  The drapes were then removed. The patient was then transferred to the PACU in stable condition. All counts were correct at the end of the case.     Complications:  None; patient tolerated the procedure well.    Disposition: PACU - hemodynamically stable.  Condition: stable      Plan:                         Weight Bearing Status:  WBAT Bilateral LE                        Range of Motion: As tolerated                        Sy: none placed                        Dressing: Maintain for one week                        DVT Prophylaxis:  ASA 81mg BID x 4 weeks                         Antibiotics: Continue x24 hours vito-operatively                         Discharge/Follow-up: Follow up in clinic in two weeks      Kanika Hallman DO  Attending Surgeon  Joint Replacement and Adult Reconstructive Surgery  [place], [place]      Attending Attestation: I was present and scrubbed for the entire  procedure.    This note was dictated using speech recognition software and was not corrected for spelling or grammatical errors

## 2024-07-31 NOTE — ANESTHESIA POSTPROCEDURE EVALUATION
Patient: Osmar Pond    Procedure Summary       Date: 07/31/24 Room / Location: [facility] OR 07 / [facility]    Anesthesia Start: 0736 Anesthesia Stop: 0941    Procedure: RIGHT Total Hip Arthroplasty *23 hr obs* [place] * (Modular Dual Mobility) Spinal (Right: Hip) Diagnosis:       Primary osteoarthritis of both hips      (Primary osteoarthritis of both hips [M16.0])    Surgeons: Kanika Hallman DO Responsible Provider: DORINA Juarez    Anesthesia Type: general, spinal ASA Status: 3            Anesthesia Type: general, spinal    Vitals Value Taken Time   /99 07/31/24 1022   Temp 36.7 °C (98 °F) 07/31/24 1013   Pulse 51 07/31/24 1023   Resp 10 07/31/24 1024   SpO2 98 % 07/31/24 1024   Vitals shown include unfiled device data.    Anesthesia Post Evaluation    Patient location during evaluation: PACU  Patient participation: complete - patient participated  Level of consciousness: awake  Pain score: 5  Pain management: adequate  Airway patency: patent  Cardiovascular status: acceptable  Respiratory status: acceptable  Hydration status: acceptable  Postoperative Nausea and Vomiting: none    No notable events documented.

## 2024-07-31 NOTE — ANESTHESIA PROCEDURE NOTES
Spinal Block    Patient location during procedure: OR  Start time: 7/31/2024 7:43 AM  End time: 7/31/2024 7:47 AM  Reason for block: primary anesthetic, procedure for pain, at surgeon's request and post-op pain management  Staffing  Performed: SRNA   Authorized by: DORINA Juarez    Performed by: DORINA Juarez    Preanesthetic Checklist  Completed: patient identified, IV checked, risks and benefits discussed, surgical consent, pre-op evaluation, timeout performed and sterile techniques followed  Block Timeout  RN/Licensed healthcare professional reads aloud to the Anesthesia provider and entire team: Patient identity, procedure with side and site, patient position, and as applicable the availability of implants/special equipment/special requirements.    Timeout performed at:   Spinal Block  Patient position: sitting  Prep: Betadine  Sterility prep: cap, drape, hand hygiene, mask and gloves  Sedation level: light sedation  Patient monitoring: continuous pulse oximetry, EKG, ETCO2, blood pressure and heart rate  Approach: midline  Vertebral space: L4-5  Injection technique: single-shot  Needle  Needle type: pencil-point   Free flowing CSF: yes    Assessment  Sensory level: T6 bilateral  Block outcome: block to be assessed in the OR  Procedure assessment: patient sedated but conversant throughout procedure

## 2024-07-31 NOTE — PROGRESS NOTES
Physical Therapy    Physical Therapy Evaluation & Treatment    Patient Name: Osmar Pond  MRN: [id number]  Today's Date: 7/31/2024   Time Calculation  Start Time: 1529  Stop Time: 1600  Time Calculation (min): 31 min  3304/3304-A    Assessment/Plan   PT Assessment  PT Assessment Results: Decreased strength, Decreased mobility, Impaired judgement, Decreased safety awareness, Decreased cognition, Orthopedic restrictions, Pain  Rehab Prognosis: Excellent  Barriers to Discharge: memory loss--will need family enforcement HIP PREC.  Evaluation/Treatment Tolerance: Patient tolerated treatment well  Barriers to Participation: Ability to acquire knowledge  End of Session Communication: Bedside nurse  Assessment Comment: anticipate pt will be prepared for home with family assist after 1-2 PT sessions and OT for ADL training with HIP PRECAUTIONS. Recomm LOW INTENSITY rehab for Skilled PT  End of Session Patient Position: Bed, 3 rail up, Alarm off, caregiver present  IP OR SWING BED PT PLAN  Inpatient or Swing Bed: Inpatient  PT Plan  Treatment/Interventions: Bed mobility, Transfer training, Gait training, Stair training, Neuromuscular re-education, Home exercise program, Positioning  PT Plan: Ongoing PT, OT consult  PT Frequency: BID  PT Discharge Recommendations: Low intensity level of continued care  Equipment Recommended upon Discharge: Bedside commode  PT Recommended Transfer Status: Assist x1  PT - OK to Discharge: Yes    Subjective     General Visit Information:  General  Reason for Referral: R CLARISA SURGERY 7/31/24  Referred By: ELSA VENEGAS. PT FOR RECENT SURG: CLARISA  Past Medical History Relevant to Rehab: CAD s/p CABG 2021, pSVT, vertigo, ST memory loss  Family/Caregiver Present: Yes  Caregiver Feedback: dtr and spouse observed session, very supportive  Prior to Session Communication: Bedside nurse (approved PT)  Patient Position Received: Bed, 3 rail up, Alarm on  General Comment: pt alert, very cooperative, demos ST  memory loss (violated some hip prec immed after discussed)    Home Living:  Home Living  Type of Home: House  Lives With: Spouse  Home Adaptive Equipment: Walker rolling or standard  Home Layout: Multi-level  Home Access: Level entry (garage on main level, then 1 flight UP or 1 flight DOWN to either bathroom)  Bathroom Toilet: Standard  Bathroom Equipment: None (discussed with dtr: recomm for BSC/riser to place over toilet d/t tall pt height)  Home Living Comments: frequently up/down stairs necessary to access bathrooms    Prior Level of Function:  Prior Function Per Pt/Caregiver Report  Level of [place]: Independent with ADLs and functional transfers, Independent with homemaking with ambulation    Precautions:  Precautions  LE Weight Bearing Status: Weight Bearing as Tolerated  Post-Surgical Precautions: Right hip precautions  Precautions Comment: ICE to R hip       Objective     Pain:  Pain Assessment  Pain Assessment: 0-10  0-10 (Numeric) Pain Score: 7  Pain Type: Surgical pain  Pain Location: Hip  Pain Orientation: Right    Cognition:  Cognition  Orientation Level: Oriented X4  Memory: Exceptions to WFL  Short-Term Memory: Impaired (per chart, h/o ST memory loss)    General Assessments:  General Observation  General Observation: transfer OOB, pt asked to zuri all clothing so assisted/instructed hip prec for LB dressing (pt demos NO carryover of instruction but spouse did observe). gait training in hallway, RTB and performed CLARISA exercise, pillow between legs for hip prec, ICE to R hip   Activity Tolerance  Endurance: Endurance does not limit participation in activity     Dynamic Sitting Balance  Dynamic Sitting-Comments: normal  Dynamic Standing Balance  Dynamic Standing-Comments: fair (walker support, no LOB observed)    Functional Assessments:     Bed Mobility 1  Bed Mobility 1: Supine to sitting, Sitting to supine  Level of Assistance 1: Close supervision, Moderate verbal cues, Moderate tactile cues  Bed  Mobility Comments 1: to enforce/instruct hip prec.  Transfer 1  Transfer From 1: Bed to  Technique 1: Sit to stand, Stand to sit  Transfer Device 1: Walker  Transfer Level of Assistance 1: Contact guard, Moderate verbal cues, Moderate tactile cues  Trials/Comments 1: to teach/enforce hip prec  Ambulation/Gait Training 1  Device 1: Rolling walker  Assistance 1: Contact guard, Moderate verbal cues (demo to teach hip prec.)  Quality of Gait 1:  (good sequencing, occas stops c/o R hip and R knee pain)  Comments/Distance (ft) 1: 80  Stairs  Stairs: No       Extremity/Trunk Assessments:        RLE   RLE : Within Functional Limits  LLE   LLE : Within Functional Limits    Treatments:  Therapeutic Exercise  Therapeutic Exercise Performed: Yes  Therapeutic Exercise Activity 1: supine for AP, QS, GS, SAQ, HEEL SLIDES [id number]. HEP issued.        Bed Mobility 1  Bed Mobility 1: Supine to sitting, Sitting to supine  Level of Assistance 1: Close supervision, Moderate verbal cues, Moderate tactile cues  Bed Mobility Comments 1: to enforce/instruct hip prec.  Ambulation/Gait Training 1  Device 1: Rolling walker  Assistance 1: Contact guard, Moderate verbal cues (demo to teach hip prec.)  Quality of Gait 1:  (good sequencing, occas stops c/o R hip and R knee pain)  Comments/Distance (ft) 1: 80  Transfer 1  Transfer From 1: Bed to  Technique 1: Sit to stand, Stand to sit  Transfer Device 1: Walker  Transfer Level of Assistance 1: Contact guard, Moderate verbal cues, Moderate tactile cues  Trials/Comments 1: to teach/enforce hip prec  Stairs  Stairs: No    Outcome Measures:  [facility] Basic Mobility  Turning from your back to your side while in a flat bed without using bedrails: A little  Moving from lying on your back to sitting on the side of a flat bed without using bedrails: A little  Moving to and from bed to chair (including a wheelchair): A little  Standing up from a chair using your arms (e.g. wheelchair or bedside chair): A  little  To walk in hospital room: A little  Climbing 3-5 steps with railing: A lot  Basic Mobility - Total Score: 17     Goals:  Encounter Problems       Encounter Problems (Active)       HEP/ROM       STG-pt will demo HEP per handout       Start:  07/31/24    Expected End:  08/14/24               Mobility       STG - Patient will ambulate household distance with RLE heel-to-toe gait pattern       Start:  07/31/24    Expected End:  08/14/24            STG - Patient will navigate 4-6 steps with rails/device       Start:  07/31/24    Expected End:  08/14/24               Pain - Adult          Safety       STG - Patient will adhere to hip precautions during gait/ transfers       Start:  07/31/24    Expected End:  08/14/24                 Education Documentation  Precautions, taught by Ana Patel PT at 7/31/2024  4:44 PM.  Learner: Significant Other, Family, Patient  Readiness: Acceptance  Method: Explanation, Demonstration, Handout  Response: Needs Reinforcement  Comment: HEP, Cu[place] precautions    Home Exercise Program, taught by Ana Patel PT at 7/31/2024  4:44 PM.  Learner: Significant Other, Family, Patient  Readiness: Acceptance  Method: Explanation, Demonstration, Handout  Response: Needs Reinforcement  Comment: HEP, [place] precautions    Mobility Training, taught by Ana Patel PT at 7/31/2024  4:44 PM.  Learner: Significant Other, Family, Patient  Readiness: Acceptance  Method: Explanation, Demonstration, Handout  Response: Needs Reinforcement  Comment: HEP, Cu[place] precautions    Education Comments  No comments found.

## 2024-08-01 ENCOUNTER — DOCUMENTATION (OUTPATIENT)
Dept: HOME HEALTH SERVICES | Facility: HOME HEALTH | Age: 69
End: 2024-08-01
Payer: MEDICARE

## 2024-08-01 ENCOUNTER — HOME HEALTH ADMISSION (OUTPATIENT)
Dept: HOME HEALTH SERVICES | Facility: HOME HEALTH | Age: 69
End: 2024-08-01
Payer: MEDICARE

## 2024-08-01 VITALS
HEART RATE: 59 BPM | WEIGHT: 201.8 LBS | HEIGHT: 70 IN | BODY MASS INDEX: 28.89 KG/M2 | DIASTOLIC BLOOD PRESSURE: 75 MMHG | RESPIRATION RATE: 18 BRPM | TEMPERATURE: 99.1 F | OXYGEN SATURATION: 94 % | SYSTOLIC BLOOD PRESSURE: 134 MMHG

## 2024-08-01 PROBLEM — M19.90 OSTEOARTHRITIS: Status: ACTIVE | Noted: 2024-08-01

## 2024-08-01 PROCEDURE — 7100000011 HC EXTENDED STAY RECOVERY HOURLY - NURSING UNIT

## 2024-08-01 PROCEDURE — 97116 GAIT TRAINING THERAPY: CPT | Mod: GP,CQ

## 2024-08-01 PROCEDURE — 2500000001 HC RX 250 WO HCPCS SELF ADMINISTERED DRUGS (ALT 637 FOR MEDICARE OP): Performed by: ORTHOPAEDIC SURGERY

## 2024-08-01 PROCEDURE — 97110 THERAPEUTIC EXERCISES: CPT | Mod: GP,CQ

## 2024-08-01 PROCEDURE — 99233 SBSQ HOSP IP/OBS HIGH 50: CPT | Performed by: PHYSICIAN ASSISTANT

## 2024-08-01 PROCEDURE — 2500000004 HC RX 250 GENERAL PHARMACY W/ HCPCS (ALT 636 FOR OP/ED): Performed by: ORTHOPAEDIC SURGERY

## 2024-08-01 ASSESSMENT — ENCOUNTER SYMPTOMS
FREQUENCY: 0
BACK PAIN: 0
JOINT SWELLING: 0
FLANK PAIN: 0
FATIGUE: 0
NAUSEA: 0
SHORTNESS OF BREATH: 0
NUMBNESS: 0
COUGH: 0
WHEEZING: 0
FACIAL SWELLING: 0
CHEST TIGHTNESS: 0
SORE THROAT: 0
HEMATURIA: 0
LIGHT-HEADEDNESS: 0
FEVER: 0
HEADACHES: 0
DIARRHEA: 0
BLOOD IN STOOL: 0
DIAPHORESIS: 0
WOUND: 0
DIZZINESS: 0
EYE PAIN: 0
ABDOMINAL PAIN: 0
APPETITE CHANGE: 0
DYSURIA: 0
VOMITING: 0
TROUBLE SWALLOWING: 0
BRUISES/BLEEDS EASILY: 0
CHILLS: 0
PALPITATIONS: 0
HALLUCINATIONS: 0
CONSTIPATION: 0
WEAKNESS: 0

## 2024-08-01 ASSESSMENT — COGNITIVE AND FUNCTIONAL STATUS - GENERAL
DAILY ACTIVITIY SCORE: 24
CLIMB 3 TO 5 STEPS WITH RAILING: A LITTLE
STANDING UP FROM CHAIR USING ARMS: A LITTLE
MOVING TO AND FROM BED TO CHAIR: A LITTLE
CLIMB 3 TO 5 STEPS WITH RAILING: A LITTLE
STANDING UP FROM CHAIR USING ARMS: A LITTLE
MOBILITY SCORE: 19
WALKING IN HOSPITAL ROOM: A LITTLE
TURNING FROM BACK TO SIDE WHILE IN FLAT BAD: A LITTLE
TURNING FROM BACK TO SIDE WHILE IN FLAT BAD: A LITTLE
WALKING IN HOSPITAL ROOM: A LITTLE
MOBILITY SCORE: 19
MOVING TO AND FROM BED TO CHAIR: A LITTLE

## 2024-08-01 ASSESSMENT — PAIN SCALES - GENERAL: PAINLEVEL_OUTOF10: 0 - NO PAIN

## 2024-08-01 ASSESSMENT — PAIN - FUNCTIONAL ASSESSMENT: PAIN_FUNCTIONAL_ASSESSMENT: 0-10

## 2024-08-01 NOTE — DISCHARGE SUMMARY
Discharge Diagnosis  Right Total Hip Replacement    Issues Requiring Follow-Up  Home care services to start within 48 hours. Outpatient PT to start per surgeon instructions.    Test Results Pending At Discharge  Pending Labs       No current pending labs.            Hospital Course  Patient underwent surgery for Right Total Hip Replacement without complications. Patient was then takent to the PACU in stabe condition. Patient was then transferred to the [facility].  Pain was appropriately controlled and weaned to oral medications. Diet was advanced as tolerated. PT/OT was consulted to begin on post operative day 0 and recommended Home for patient on discharge. Patient had uneventful hospital course. Patient is to follow-up in 6 weeks at scheduled post-op visit.      Face to Face following surgical procedure on 08/01/24. The patient was awake and alert. VSS, afebrile. Sensation intact bilaterally, sural/saph/sp/tibal n. Motor intact flexion/extension/DF/PF/EHL/FHL bilaterally. Palpable symmetric DP/PT pulse bilaterally.    Pertinent Physical Exam At Time of Discharge  Physical Exam  Vitals and nursing note reviewed. Exam conducted with a chaperone present.   Constitutional:       Appearance: Normal appearance.   HENT:      Head: Normocephalic and atraumatic.   Eyes:      Extraocular Movements: Extraocular movements intact.   Cardiovascular:      Rate and Rhythm: Normal rate and regular rhythm.      Pulses: Normal pulses.      Heart sounds: Normal heart sounds.   Pulmonary:      Effort: Pulmonary effort is normal.   Abdominal:      Palpations: Abdomen is soft.   Musculoskeletal:      GENERAL: A/Ox3, NAD. Appears healthy, well nourished  CARDIAC: regular rate  LUNGS: Breathing non-labored    MUSCULOSKELETAL:  Laterality:  right hip     - Incision: dressing in place with no saturation  - Palpation: appropriate post operative TTP along surgical incision  - Can actively straight leg raise  - compartments soft, negative  homans    NEUROVASCULAR:  - Neurovascular Status: sensation intact to light touch distally  - Capillary refill brisk at extremities, Bilateral dorsalis pedis pulse 2+     Skin:     General: Skin is warm and dry.      Capillary Refill: Capillary refill takes less than 2 seconds.   Neurological:      General: No focal deficit present.      Mental Status: Patient is alert and oriented to person, place, and time. Mental status is at baseline.   Psychiatric:         Mood and Affect: Mood normal.         Thought Content: Thought content normal.         Judgment: Judgment normal.           Home Medications  Medication List      Your medication list        START taking these medications        Instructions Last Dose Given Next Dose Due   acetaminophen 325 mg tablet  Commonly known as: Tylenol  Replaces: acetaminophen 650 mg ER tablet      Take 3 tablets (975 mg) by mouth every 8 hours if needed for mild pain (1 - 3).       aspirin 81 mg chewable tablet  Replaces: aspirin 81 mg EC tablet      Chew 1 tablet (81 mg) 2 times a day. To prevent blood clots       celecoxib 200 mg capsule  Commonly known as: CeleBREX      Take 1 capsule (200 mg) by mouth 2 times a day. For pain, inflammation, and swelling       cyclobenzaprine 5 mg tablet  Commonly known as: Flexeril      Take 1 tablet (5 mg) by mouth 3 times a day as needed for muscle spasms for up to 10 days.       oxyCODONE 5 mg immediate release tablet  Commonly known as: Roxicodone      Take 1 tablet (5 mg) by mouth every 6 hours if needed for severe pain (7 - 10) for up to 7 days.       pantoprazole 40 mg EC tablet  Commonly known as: ProtoNix      Take 1 tablet (40 mg) by mouth once daily as needed (heartburn). Do not crush, chew, or split.       senna 8.6 mg tablet  Generic drug: sennosides      Take 1 tablet (8.6 mg) by mouth 2 times a day. To prevent constipation       traMADol 50 mg tablet  Commonly known as: Ultram      Take 1 tablet (50 mg) by mouth every 6 hours if  needed for severe pain (7 - 10) for up to 7 days.              CONTINUE taking these medications        Instructions Last Dose Given Next Dose Due   atorvastatin 40 mg tablet  Commonly known as: Lipitor      Take 1 tablet (40 mg) by mouth once daily.       cyanocobalamin 1,000 mcg tablet  Commonly known as: Vitamin B-12           Daily Multi-Vitamin tablet  Generic drug: multivitamin           donepezil 5 mg tablet  Commonly known as: Aricept      Take 1 tablet (5 mg) by mouth once daily at bedtime.       Vitamin D3 50 MCG (2000 UT) tablet  Generic drug: cholecalciferol                  STOP taking these medications      acetaminophen 650 mg ER tablet  Commonly known as: Tylenol 8 HOUR  Replaced by: acetaminophen 325 mg tablet        aspirin 81 mg EC tablet  Replaced by: aspirin 81 mg chewable tablet        chlorhexidine 0.12 % solution  Commonly known as: Peridex                  Where to Get Your Medications        These medications were sent to [facility]  [address] [address]      Hours: 8AM to 6PM Mon-Fri, 8AM to 4PM Sat-Sun Phone: [phone]   acetaminophen 325 mg tablet  aspirin 81 mg chewable tablet  celecoxib 200 mg capsule  cyclobenzaprine 5 mg tablet  oxyCODONE 5 mg immediate release tablet  pantoprazole 40 mg EC tablet  senna 8.6 mg tablet  traMADol 50 mg tablet             Outpatient Follow-Up: THREE WEEKS  OFFICE LOCATIONS    Location 1: [facility]  [address]  [place] [address]  Office Number: [phone]    Location 2: [facility]  [address]  [facility], [address]  Office Number: [phone]    Location 3: Critical access hospital  [address], [address]  [place] [address]  Office Number: [phone]        Please read discharge instructions provided by your surgeon before calling with questions as this will delay care.    Medication refills-Oxycodone and Tramadol will be refilled every 7 days per state law.  Please request refills through [facility] preferably/[phone]. All medication requests may take up to 72 hours to refill and refills after Friday 1pm will be refilled on the next business day.

## 2024-08-01 NOTE — PROGRESS NOTES
Osmar Pond is a 69 y.o. male on day 1 of admission presenting with Primary osteoarthritis of both hips.      Subjective   Osmra Pond is a 69 y.o. male with PMHx of arthritis, anxiety, CAD s/p CABG, HLD, PSVT and vertigo who presented s/p elective R CLARISA    8/1/2024: No acute events overnight. Vitals stable, has some bradycardia. No AM labs. He feels well today, two family members at bedside, he is asking for discharge to home ASAP- relayed to primary team         Review of Systems   Constitutional:  Negative for appetite change, chills, diaphoresis, fatigue and fever.   HENT:  Negative for congestion, ear pain, facial swelling, hearing loss, nosebleeds, sore throat, tinnitus and trouble swallowing.    Eyes:  Negative for pain.   Respiratory:  Negative for cough, chest tightness, shortness of breath and wheezing.    Cardiovascular:  Negative for chest pain, palpitations and leg swelling.   Gastrointestinal:  Negative for abdominal pain, blood in stool, constipation, diarrhea, nausea and vomiting.   Genitourinary:  Negative for dysuria, flank pain, frequency, hematuria and urgency.   Musculoskeletal:  Negative for back pain and joint swelling.        R hip pain   Skin:  Negative for rash and wound.   Neurological:  Negative for dizziness, syncope, weakness, light-headedness, numbness and headaches.   Hematological:  Does not bruise/bleed easily.   Psychiatric/Behavioral:  Negative for behavioral problems, hallucinations and suicidal ideas.           Objective     Last Recorded Vitals  /75 (BP Location: Right arm, Patient Position: Lying)   Pulse 59   Temp 37.3 °C (99.1 °F) (Temporal)   Resp 18   Wt 91.5 kg (201 lb 12.8 oz)   SpO2 94%     Image Results  ECG 12 lead    Result Date: 7/31/2024  Sinus rhythm Multiform ventricular premature complexes Incomplete right bundle branch block Low voltage, precordial leads Confirmed by Maurice De La Cruz ([id number]) on 7/31/2024 1:45:55 PM    XR pelvis 1-2 views    Result  Date: 7/31/2024  Interpreted By:  Alla Jacobo, STUDY: XR PELVIS 1-2 VIEWS; ;  7/31/2024 9:56 am   INDICATION: Signs/Symptoms:Post op hip.   COMPARISON: 07/31/2024 at 8:43 a.m.   ACCESSION NUMBER(S): [id number]   ORDERING CLINICIAN: NGOZI VENEAGS   FINDINGS: AP view of the pelvis excludes the iliac crests. Endoprosthesis has been placed in the right hip. No fracture is noted in visible native bones. Soft tissue air is present from preceding surgery. Radiopaque sutures are noted in the left inguinal ring area compatible with prior hernia repair.       Right hip endoprosthesis placement     MACRO: None   Signed by: Alla Jacobo 7/31/2024 10:16 AM Dictation workstation:   [id number]    XR pelvis 1-2 views    Result Date: 7/31/2024  These images are not reportable by radiology and will not be interpreted by  Radiologists.    XR chest 2 views    Result Date: 7/25/2024  Interpreted By:  Giorgio Suarez, STUDY: XR CHEST 2 VIEWS;  7/24/2024 9:03 am   INDICATION: Signs/Symptoms:Upcoming total joint replacement.   COMPARISON: Chest radiograph 12/20/2021   ACCESSION NUMBER(S): [id number]   ORDERING CLINICIAN: BERHANE DAVIS   FINDINGS:     CARDIOMEDIASTINAL SILHOUETTE: Cardiomediastinal silhouette is stable in size and configuration. Status post median sternotomy.   LUNGS: No consolidation, pneumothorax, or effusion.   ABDOMEN: No remarkable upper abdominal findings.   BONES: No acute osseous changes.   Remaining findings appear stable since prior comparison radiograph.       1.  No evidence of acute cardiopulmonary process.     Signed by: Giorgio Suarez 7/25/2024 11:34 AM Dictation workstation:   [id number]    XR pelvis 1-2 views    Result Date: 7/10/2024  Interpreted By:  Vita Steinberg, STUDY: XR PELVIS 1-2 VIEWS; ;  7/9/2024 8:24 am   INDICATION: Signs/Symptoms:PAIN.   COMPARISON: 06/26/2024   ACCESSION NUMBER(S): [id number]   ORDERING CLINICIAN: NGOZI VENEGAS   FINDINGS: There are moderate to severe  degenerative changes in bilateral hip joints, more pronounced in the right hip joint compared to the left with significant joint space narrowing and exuberant marginal osteophytes. Bilateral sacroiliac joints and pubic symphysis are intact and demonstrates mild-to-moderate degenerative changes. There are mild discogenic degenerative changes in the lower lumbar spine. No acute fracture or dislocation within limits of single view examination.       Moderate to severe bilateral hip arthrosis, more pronounced on the right compared to the left.     MACRO: None   Signed by: Vita Steinberg 7/10/2024 12:28 PM Dictation workstation:   [id number]       Lab Results  Results for orders placed or performed during the hospital encounter of 07/31/24 (from the past 24 hour(s))   ECG 12 lead   Result Value Ref Range    Ventricular Rate 64 BPM    Atrial Rate 66 BPM    MA Interval 159 ms    QRS Duration 114 ms    QT Interval 459 ms    QTC Calculation(Bazett) 474 ms    P Axis 53 degrees    R Axis -23 degrees    T Axis 9 degrees    QRS Count 11 beats    Q Onset 249 ms    T Offset 479 ms    QTC Fredericia 468 ms        Medications  Scheduled medications:  acetaminophen, 650 mg, oral, q6h EVELYN  aspirin, 81 mg, oral, BID  atorvastatin, 40 mg, oral, Nightly  donepezil, 5 mg, oral, Nightly  pantoprazole, 40 mg, oral, Daily before breakfast  scopolamine, 1 patch, transdermal, q72h  sennosides, 2 tablet, oral, BID      Continuous medications:  oxygen, 2 L/min, Last Rate: Stopped (07/31/24 1100)      PRN medications:  PRN medications: bisacodyl, cyclobenzaprine, diphenhydrAMINE, diphenhydrAMINE, metoclopramide **OR** metoclopramide, naloxone, naloxone, ondansetron ODT **OR** ondansetron, oxyCODONE, oxyCODONE     Physical Exam  Constitutional:       General: He is not in acute distress.     Appearance: Normal appearance.   HENT:      Head: Normocephalic and atraumatic.      Right Ear: External ear normal.      Left Ear: External ear normal.       Nose: Nose normal.      Mouth/Throat:      Mouth: Mucous membranes are moist.      Pharynx: Oropharynx is clear.   Eyes:      Extraocular Movements: Extraocular movements intact.      Conjunctiva/sclera: Conjunctivae normal.      Pupils: Pupils are equal, round, and reactive to light.   Cardiovascular:      Rate and Rhythm: Regular rhythm. Bradycardia present.      Pulses: Normal pulses.      Heart sounds: Normal heart sounds.   Pulmonary:      Effort: Pulmonary effort is normal. No respiratory distress.      Breath sounds: Normal breath sounds. No wheezing, rhonchi or rales.   Abdominal:      General: Bowel sounds are normal.      Palpations: Abdomen is soft.      Tenderness: There is no abdominal tenderness. There is no right CVA tenderness, left CVA tenderness, guarding or rebound.   Musculoskeletal:      Cervical back: Normal range of motion and neck supple.      Comments: Post-operative dressing is C/D/I, did not remove dressing  NVS intact distal to operative site   Skin:     General: Skin is warm and dry.      Capillary Refill: Capillary refill takes less than 2 seconds.      Findings: No lesion or rash.   Neurological:      General: No focal deficit present.      Mental Status: He is alert and oriented to person, place, and time. Mental status is at baseline.   Psychiatric:         Mood and Affect: Mood normal.         Behavior: Behavior normal.                  Code Status  Full Code     Assessment/Plan      OA s/p R CLARISA 7/31/24  Management per primary surgery service   Continue pain and bowel regimen, pt advised to stay ahead of pain     CAD s/p CABG x4 2021  Continue ASA and statin     Short-term memory loss  Resume Aricept     HLD  Resume atorvastatin     PVCs  Reviewed Ecg  Discussed with patient and his family, he reports he sees Dr. Shipman who is aware and not concerned regarding these.    DVT ppx: Per primary     Please see orders for more complete plan    Thank you for involving us in the care of  this very pleasant patient. We will follow along with you while they remain admitted. Please contact Hospitalist service if any clarification needed.     Aziza Garay PA-C

## 2024-08-01 NOTE — PROGRESS NOTES
Physical Therapy    Physical Therapy Treatment    Patient Name: Osmar Pond  MRN: [id number]  Today's Date: 8/1/2024  Time Calculation  Start Time: 0735  Stop Time: 0815  Time Calculation (min): 40 min  Pm treat 11:30- 11:45      Assessment/Plan   PT Assessment  PT Assessment Results: Decreased strength, Decreased range of motion, Decreased endurance, Decreased mobility, Decreased safety awareness  End of Session Communication: Bedside nurse, PCT/NA/CTA  Assessment Comment: still requires mutiple cues for safety andfollow through  End of Session Patient Position: Up in chair (alarm in room patient eating aide stated she would zuri jessi pateint done eating)  PT Plan  Inpatient/Swing Bed or Outpatient: Inpatient  PT Plan  Treatment/Interventions: Bed mobility, Transfer training, Gait training, Therapeutic exercise, Therapeutic activity  PT Plan: Ongoing PT, OT consult  PT Frequency: BID  PT Discharge Recommendations: Low intensity level of continued care  Equipment Recommended upon Discharge: Bedside commode  PT Recommended Transfer Status: Assist x1  PT - OK to Discharge: Yes      General Visit Information:   PT  Visit  PT Received On: 08/01/24  Response to Previous Treatment: Patient reporting fatigue but able to participate.  General  Prior to Session Communication: Bedside nurse, PCT/NA/CTA  Patient Position Received: Bed, 3 rail up, Alarm off, not on at start of session  General Comment: patient eager for therapy, forgetful reveiwed and practiced HEP and [id number]    Subjective   Precautions:  Precautions  Hearing/Visual Limitations: [place] has hearing aides,  Vital Signs:       Objective   Pain:     Cognition:  Cognition  Orientation Level: Oriented X4 (but seems forgetful)  Coordination:     Postural Control:     Extremity/Trunk Assessments:    Activity Tolerance:  Activity Tolerance  Endurance: Tolerates 30 min exercise with multiple rests  Treatments:  patient required increased time and repeat of  directions once reading HEP, performed HEP 20 reps each with mod assist for execution correctly,supine to sit with cGA, sit to stand CGa gait training in and out of bathroom cGA with FWW, gait training with  feet with FWw to stairs ascend /descend 10 steps with one railing and CGA. patient unable to recall hip precautions or home going procedures will review with wife again when she comes in if OT does not.   PM treat- wife and daughter present . Reviewed and practices HEP plan again 20 reps each with verbal cues. Reviewed homegoing instructions and safety, icing etc. With family.    Outcome Measures:  [facility] Basic Mobility  Turning from your back to your side while in a flat bed without using bedrails: None  Moving from lying on your back to sitting on the side of a flat bed without using bedrails: A little  Moving to and from bed to chair (including a wheelchair): A little  Standing up from a chair using your arms (e.g. wheelchair or bedside chair): A little  To walk in hospital room: A little  Climbing 3-5 steps with railing: A little  Basic Mobility - Total Score: 19    Education Documentation  Precautions, taught by Digna Dillon PTA at 8/1/2024  8:31 AM.  Learner: Patient  Readiness: Acceptance  Method: Explanation  Response: Verbalizes Understanding, Needs Reinforcement  Comment: claudia goldsmith carry over    Home Exercise Program, taught by Digna Dillon PTA at 8/1/2024  8:31 AM.  Learner: Patient  Readiness: Acceptance  Method: Explanation  Response: Verbalizes Understanding, Needs Reinforcement  Comment: claudia goldsmith carry over    Mobility Training, taught by Digna Dillon PTA at 8/1/2024  8:31 AM.  Learner: Patient  Readiness: Acceptance  Method: Explanation  Response: Verbalizes Understanding, Needs Reinforcement  Comment: claudia goldsmith carry over    Education Comments  No comments found.        OP EDUCATION:       Encounter Problems       Encounter Problems (Active)        HEP/ROM       STG-pt will demo HEP per handout (Progressing)       Start:  07/31/24    Expected End:  08/14/24               Pain - Adult          Safety       STG - Patient will adhere to hip precautions during gait/ transfers (Progressing)       Start:  07/31/24    Expected End:  08/14/24                  Encounter Problems (Resolved)       Mobility       STG - Patient will ambulate household distance with RLE heel-to-toe gait pattern (Met)       Start:  07/31/24    Expected End:  08/14/24    Resolved:  08/01/24         STG - Patient will navigate 4-6 steps with rails/device (Met)       Start:  07/31/24    Expected End:  08/14/24    Resolved:  08/01/24

## 2024-08-01 NOTE — HH CARE COORDINATION
Home Care received a Referral for Physical Therapy and Occupational Therapy. We have processed the referral for a Start of Care on 8/2/24.     If you have any questions or concerns, please feel free to contact us at [phone]. Follow the prompts, enter your five digit zip code, and you will be directed to your care team on [facility].

## 2024-08-01 NOTE — PROGRESS NOTES
Social work consult placed for discharge planning. SW reviewed pt's chart and communicated with TCC. No SW needs foreseen at this time. SW signing off; available upon request.    PABLO Hernandez ([id number])   Care Transitions

## 2024-08-01 NOTE — NURSING NOTE
This RN spoke with patient about fall precautions associated with being a high fall risk. The patient states he understands why he has a bed/chair alarm. We also spoke about using a call light to ask for help from staff when ambulating. This RN was able to update patient on status and educate on all questions about care. Spoke with patient use of SCDs when in bed and at night time. Patient states he has been wearing them. We also spoke about the benefits of wearing them and their use.

## 2024-08-02 ENCOUNTER — HOME CARE VISIT (OUTPATIENT)
Dept: HOME HEALTH SERVICES | Facility: HOME HEALTH | Age: 69
End: 2024-08-02
Payer: MEDICARE

## 2024-08-02 DIAGNOSIS — G31.84 MCI (MILD COGNITIVE IMPAIRMENT) WITH MEMORY LOSS: ICD-10-CM

## 2024-08-02 PROCEDURE — 169592 NO-PAY CLAIM PROCEDURE

## 2024-08-02 PROCEDURE — G0151 HHCP-SERV OF PT,EA 15 MIN: HCPCS | Mod: HHH

## 2024-08-02 RX ORDER — DONEPEZIL HYDROCHLORIDE 5 MG/1
5 TABLET, FILM COATED ORAL NIGHTLY
Qty: 30 TABLET | Refills: 0 | Status: SHIPPED | OUTPATIENT
Start: 2024-08-02

## 2024-08-03 VITALS — RESPIRATION RATE: 12 BRPM

## 2024-08-03 SDOH — HEALTH STABILITY: PHYSICAL HEALTH: EXERCISE ACTIVITIES SETS: 2

## 2024-08-03 SDOH — HEALTH STABILITY: PHYSICAL HEALTH: PHYSICAL EXERCISE: SIT, RECLINED

## 2024-08-03 SDOH — HEALTH STABILITY: PHYSICAL HEALTH: EXERCISE ACTIVITY: R THA

## 2024-08-03 SDOH — HEALTH STABILITY: PHYSICAL HEALTH: PHYSICAL EXERCISE: 5

## 2024-08-03 ASSESSMENT — ENCOUNTER SYMPTOMS
PERSON REPORTING PAIN: PATIENT
PAIN LOCATION: RIGHT HIP
PAIN: 1
HIGHEST PAIN SEVERITY IN PAST 24 HOURS: 3/10
LOWEST PAIN SEVERITY IN PAST 24 HOURS: 0/10
LIMITED RANGE OF MOTION: 1
MUSCLE WEAKNESS: 1
SUBJECTIVE PAIN PROGRESSION: GRADUALLY IMPROVING
PAIN SEVERITY GOAL: 0/10
PAIN LOCATION - PAIN QUALITY: THA

## 2024-08-03 ASSESSMENT — ACTIVITIES OF DAILY LIVING (ADL)
CURRENT_FUNCTION: MODERATE ASSIST
ENTERING_EXITING_HOME: MODERATE ASSIST
OASIS_M1830: 05
AMBULATION ASSISTANCE: 1
AMBULATION ASSISTANCE: MODERATE ASSIST
CURRENT_FUNCTION: ONE PERSON
AMBULATION ASSISTANCE: ONE PERSON
PHYSICAL TRANSFERS ASSESSED: 1
AMBULATION ASSISTANCE ON FLAT SURFACES: 1

## 2024-08-06 ENCOUNTER — HOME CARE VISIT (OUTPATIENT)
Dept: HOME HEALTH SERVICES | Facility: HOME HEALTH | Age: 69
End: 2024-08-06
Payer: MEDICARE

## 2024-08-06 VITALS — RESPIRATION RATE: 12 BRPM

## 2024-08-06 PROCEDURE — G0151 HHCP-SERV OF PT,EA 15 MIN: HCPCS | Mod: HHH

## 2024-08-06 PROCEDURE — G0152 HHCP-SERV OF OT,EA 15 MIN: HCPCS | Mod: HHH

## 2024-08-06 SDOH — HEALTH STABILITY: PHYSICAL HEALTH: PHYSICAL EXERCISE: SIT, STAND, RECLINED

## 2024-08-06 SDOH — HEALTH STABILITY: PHYSICAL HEALTH: EXERCISE ACTIVITIES SETS: 1

## 2024-08-06 SDOH — HEALTH STABILITY: PHYSICAL HEALTH: EXERCISE ACTIVITY: R THA

## 2024-08-06 SDOH — HEALTH STABILITY: PHYSICAL HEALTH: PHYSICAL EXERCISE: 10

## 2024-08-06 ASSESSMENT — ACTIVITIES OF DAILY LIVING (ADL)
PHYSICAL TRANSFERS ASSESSED: 1
ADLS_COMMENTS: PLUG IN THE RAZOR WITHOUT HELP, AS WELL AS RETRIEVE IT FROM THE DRAWER OR CABINET. A FEMALE PATIENT MUST APPLY HER OWN MAKE-UP, IF USED, BUT NEED NOT BRAID OR STYLE HER HAIR.     FEEDING    0    DEPENDENT IN ALL ASPECTS AND NEEDS TO BE FED, NASOGAST
AMBULATION ASSISTANCE: ONE PERSON
ADLS_COMMENTS: N.   8    ASSISTANCE IS REQUIRED WITH REACHING AIDS AND/OR THEIR MANIPULATION. ONE PERSON IS REQUIRED TO OFFER ASSISTANCE.   12   THE PATIENT IS INDEPENDENT IN AMBULATION BUT UNABLE TO WALK 50 METRES WITHOUT HELP, OR SUPERVISION IS NEEDED FOR CONFIDE
ADLS_COMMENTS: E TO WALK OR USE WHEELCHAIR INDEPENDENTLY.
ADLS_COMMENTS: NG   0    THE PATIENT IS UNABLE TO CLIMB STAIRS.   2    ASSISTANCE IS REQUIRED IN ALL ASPECTS OF CHAIR CLIMBING, INCLUDING ASSISTANCE WITH WALKING AIDS.   5    THE PATIENT IS ABLE TO ASCEND/DESCEND BUT IS UNABLE TO CARRY WALKING AIDS AND NEEDS SUPERV
ADLS_COMMENTS: ES, CANES, OR A WALKARETTE, AND WALK 50 METRES WITHOUT HELP OR SUPERVISION.     AMBULATION/WHEELCHAIR * (IF UNABLE TO WALK) ONLY USE THIS ITEM IF THE PATIENT IS RATED “0” FOR AMBULATION, AND THEN ONLY IF THE PATIENT HAS BEEN TRAINED IN WHEELCHAIR MAN
ADLS_COMMENTS: POSITION, AND WITH BOWEL MOVEMENT FACILITATORY TECHNIQUES.   5   THE PATIENT CAN ASSUME APPROPRIATE POSITION, BUT CANNOT USE FACILITATORY TECHNIQUES OR CLEAN SELF WITHOUT ASSISTANCE AND HAS FREQUENT ACCIDENTS.   8    ASSISTANCE IS REQUIRED WITH INCON
ADLS_COMMENTS: INIMAL ASSISTANCE IS REQUIRED WITH FASTENING CLOTHING SUCH AS BUTTONS, ZIPS, BRA, SHOES, ETC.   10   THE PATIENT IS ABLE TO PUT ON, REMOVE, CORSET, BRACES, AS PRESCRIBED.     PERSONAL HYGIENE (GROOMING)   0    THE PATIENT IS UNABLE TO ATTEND TO PERSO
ADLS_COMMENTS: AND NIGHT, AND/OR IS INDEPENDENT WITH INTERNAL OR EXTERNAL DEVICES.     BATHING   0    TOTAL DEPENDENCE IN BATHING SELF.   1    ASSISTANCE IS REQUIRED IN ALL ASPECTS OF BATHING, BUT PATIENT IS ABLE TO MAKE SOME CONTRIBUTION.   3    ASSISTANCE IS REQ
ADLS_COMMENTS: TINENCE AIDS SUCH AS PAD, ETC. THE PATIENT MAY REQUIRE SUPERVISION WITH THE USE OF SUPPOSITORY OR ENEMA AND HAS OCCASIONAL ACCIDENTS.   10   THE PATIENT CAN CONTROL BOWELS AND HAS NO ACCIDENTS, CAN USE SUPPOSITORY, OR TAKE AN ENEMA WHEN NECESSARY.
ADLS_COMMENTS: LE TO CONDUCT OWN PERSONAL HYGIENE BUT REQUIRES MIN ASSIST BEFORE AND/OR AFTER THE OPERATION.   5    THE PATIENT CAN WASH HIS/HER HANDS AND FACE, COMB HAIR, CLEAN TEETH AND SHAVE. A MALE PATIENT MAY USE ANY KIND OF RAZOR BUT MUST INSERT THE BLADE, OR
ADLS_COMMENTS: MILK/SUGAR INTO TEA, SALT, PEPPER, SPREADING BUTTER, TURNING A PLATE OR OTHER “SET UP” ACTIVITIES.   8    INDEP WITH PREPARED TRAY, MAY NEED MEAT CUT, MILK CARTON/JAR LID OPENED. ANOTHER PERSON IS NOT REQUIRED  10   THE PATIENT CAN FEED SELF FROM A
ADLS_COMMENTS: EMENT OF CLOTHING, TRANSFERRING, OR WASHING HANDS.   8    SUP MAY BE REQUIRED FOR SAFETY WITH NORMAL TOILET. BSC MAY BE USED AT NIGHT, ASSIST FOR EMPTYING AND CLEANING.   10   THE PATIENT IS ABLE TO GET ON/OFF THE TOILET, FASTEN CLOTHING AND USE TOIL
AMBULATION ASSISTANCE: 1
ADLS_COMMENTS: RIC NEEDS TO BE ADMINISTERED.   2    CAN MANIPULATE AN EATING DEVICE, USUALLY A SPOON, BUT SOMEONE MUST PROVIDE ACTIVE ASSISTANCE DURING THE MEAL.   5    ABLE TO FEED SELF WITH SUPERVISION. ASSISTANCE IS REQUIRED WITH ASSOCIATED TASKS SUCH AS PUTTING
AMBULATION ASSISTANCE: MODERATE ASSIST
ADLS_COMMENTS: AIR, TRANSFER BACK INTO IT SAFELY AND/OR GRASP AID AND STAND. THE PATIENT MUST BE INDEPENDENT IN ALL PHASES OF THIS ACTIVITY.     AMBULATION    0    DEPENDENT IN AMBULATION  3    CONSTANT PRESENCE OF ONE OR MORE ASSISTANT IS REQUIRED DURING AMBULATIO
ADLS_COMMENTS: WHERE PATIENTS MOVE FROM DEPENDENCY TO ASSISTED INDEPENDENCE.   60 - 80    IF LIVING ALONE WILL PROBABLY NEED A NUMBER OF COMMUNITY SERVICES TO COPE.   **MORE THAN 85     LIKELY TO BE DISCHARGED TO COMMUNITY LIVING - INDEPENDENT IN TRANSFERS AND ABL
ADLS_COMMENTS: ET PAPER WITHOUT HELP. IF NECESSARY, THE PATIENT MAY USE A BED PAN OR COMMODE OR URINAL AT NIGHT, BUT MUST BE ABLE TO EMPTY IT AND CLEAN IT.     BOWEL CONTROL   0   THE PATIENT IS BOWEL INCONTINENT.   2   THE PATIENT NEEDS HELP TO ASSUME APPROPRIATE
ADLS_COMMENTS: NAL HYGIENE AND IS DEPENDENT IN ALL ASPECTS.   1    ASSISTANCE IS REQUIRED IN ALL STEPS OF PERSONAL HYGIENE, BUT PATIENT ABLE TO MAKE SOME CONTRIBUTION.   3    SOME ASSISTANCE IS REQUIRED IN ONE OR MORE STEPS OF PERSONAL HYGIENE.   4    PATIENT IS AB
ADLS_COMMENTS: THE PATIENT MAY USE A BATHTUB, A SHOWER, OR TAKE A COMPLETE SPONGE BATH. THE PATIENT MUST BE ABLE TO DO ALL THE STEPS OF WHICHEVER METHOD IS EMPLOYED WITHOUT ANOTHER PERSON BEING PRESENT.     DRESSING   0     THE PATIENT IS DEPENDENT IN ALL ASPECTS
ADLS_COMMENTS: 5    TO PROPEL WHEELCHAIR INDEPENDENTLY, THE PATIENT MUST BE ABLE TO GO AROUND CORNERS, TURN AROUND, MANOEUVRE THE CHAIR TO A TABLE, BED, TOILET, ETC. THE PATIENT MUST BE ABLE TO PUSH A CHAIR AT LEAST 50 METRES AND NEGOTIATE KERB.       STAIR CLIMBI
ADLS_COMMENTS: ANCE IS REQUIRED TO MANIPULATE CHAIR TO TABLE, BED, ETC.   4    THE PATIENT CAN PROPEL SELF FOR A REASONABLE DURATION OVER REGULARLY ENCOUNTERED TERRAIN. MINIMAL ASSISTANCE MAY STILL BE REQUIRED IN “TIGHT CORNERS” OR TO NEGOTIATE A KERB 100MM HIGH.
ADLS_COMMENTS: OF DRESSING AND IS UNABLE TO PARTICIPATE IN THE ACTIVITY.   2     THE PATIENT IS ABLE TO PARTICIPATE TO SOME DEGREE, BUT IS DEPENDENT IN ALL ASPECTS OF DRESSING.   5     ASSISTANCE IS NEEDED IN PUTTING ON, AND/OR REMOVING ANY CLOTHING.   8     ONLY M
ADLS_COMMENTS: EVERE DEPENDENCE     21 - 60  MODERATE DEPENDENCE     61 - 90  **SLIGHT DEPENDENCE      91 - 99  INDEPENDENCE        100    SCORE PREDICTION    LESS THAN 40    UNLIKELY TO GO HOME - DEPENDENT IN MOBILITY - DEPENDENT IN SELF CARE   60    PIVOTAL SCORE
ADLS_COMMENTS: BLADDER CONTROL   0    THE PATIENT IS DEPENDENT IN BLADDER MANAGEMENT, IS INCONTINENT, OR HAS INDWELLING CATHETER.   2    THE PATIENT IS INCONTINENT BUT IS ABLE TO ASSIST WITH THE APPLICATION OF AN INTERNAL OR EXTERNAL DEVICE.   5    THE PATIENT IS
ADLS_COMMENTS: AFETY.   15  THE PATIENT CAN SAFELY APPROACH THE BED WALKING OR IN A WHEELCHAIR, LOCK BRAKES, LIFT FOOTRESTS, OR POSITION WALKING AID, MOVE SAFELY TO BED, LIE DOWN, COME TO A SITTING POSITION ON THE SIDE OF THE BED, CHANGE THE POSITION OF THE WHEELCH
ADLS_COMMENTS: AGEMENT.   0    DEPENDENT IN WHEELCHAIR AMBULATION.   1    PATIENT CAN PROPEL SELF SHORT DISTANCES ON FLAT SURFACE, BUT ASSISTANCE IS REQUIRED FOR ALL OTHER STEPS OF WHEELCHAIR MANAGEMENT.  3    PRESENCE OF ONE PERSON IS NECESSARY AND CONSTANT ASSIST
AMBULATION ASSISTANCE: MINIMUM ASSIST
ADLS_COMMENTS: GENERALLY DRY BY DAY, BUT NOT AT NIGHT AND NEEDS SOME ASSISTANCE WITH THE DEVICES.   8    GENERALLY DRY BY DAY AND NIGHT, MAY HAVE OCCAS ACCIDENT OR NEED MIN ASSIST WITH INTERNAL OR EXTERNAL DEVICES.   10   THE PATIENT IS ABLE TO CONTROL BLADDER DAY
ADLS_COMMENTS: THE TRANSFER.   8    THE TRANSFER REQUIRES THE ASSISTANCE OF ONE OTHER PERSON. ASSISTANCE MAY BE REQUIRED IN ANY ASPECT OF THE TRANSFER.   12  THE PRESENCE OF ANOTHER PERSON IS REQUIRED EITHER AS A CONFIDENCE MEASURE, OR TO PROVIDE SUPERVISION FOR S
CURRENT_FUNCTION: ONE PERSON
ADLS_COMMENTS: UIRED WITH EITHER TRANSFER TO SHOWER/BATH OR WITH WASHING OR DRYING; INCLUDING INABILITY TO COMPLETE A TASK BECAUSE OF CONDITION OR DISEASE, ETC.   4    SUPERVISION IS REQUIRED FOR SAFETY IN ADJUSTING THE WATER TEMPERATURE, OR IN THE TRANSFER.   5
ADLS_COMMENTS: CHAIR/BED TRANSFERS  0    UNABLE TO PARTICIPATE IN A TRANSFER. TWO ATTENDANTS ARE REQUIRED TO TRANSFER THE PATIENT WITH OR WITHOUT A MECHANICAL DEVICE.   3    ABLE TO PARTICIPATE BUT MAXIMUM ASSISTANCE OF ONE OTHER PERSON IS REQUIRE IN ALL ASPECTS OF
CURRENT_FUNCTION: MODERATE ASSIST
AMBULATION ASSISTANCE ON FLAT SURFACES: 1
CURRENT_FUNCTION: MINIMUM ASSIST
ADLS_COMMENTS: ISION AND ASSISTANCE.   8    GENERALLY NO ASSISTANCE IS REQUIRED. AT TIMES SUP IS REQUIRED FOR SAFETY DUE TO MORNING STIFFNESS, SOB, ETC  10   THE PATIENT IS ABLE TO GO UP/DOWN A FLIGHT OF STAIRS SAFELY WITHOUT HELP OR SUPERVISION,USE HAND RAILS, CAN
ADLS_COMMENTS: NCE OR SAFETY IN HAZARDOUS SITUATIONS.   15   THE PATIENT MUST BE ABLE TO WEAR BRACES IF REQUIRED, LOCK AND UNLOCK THESE BRACES ASSUME STANDING POSITION, SIT DOWN, AND PLACE THE NECESSARY AIDS INTO POSITION FOR USE. THE PATIENT MUST BE ABLE TO CRUTCH

## 2024-08-06 ASSESSMENT — ENCOUNTER SYMPTOMS
PAIN: 1
PAIN LOCATION - PAIN FREQUENCY: CONSTANT
PERSON REPORTING PAIN: PATIENT
PAIN: 1
PAIN LOCATION - PAIN DURATION: ACUTE
PAIN SEVERITY GOAL: 0/10
LIMITED RANGE OF MOTION: 1
LOWEST PAIN SEVERITY IN PAST 24 HOURS: 0/10
PAIN LOCATION: RIGHT HIP
PERSON REPORTING PAIN: PATIENT
PAIN LOCATION - EXACERBATING FACTORS: ACTIVITY
PAIN LOCATION - PAIN QUALITY: THROBBING
MUSCLE WEAKNESS: 1
PAIN LOCATION: RIGHT HIP
PAIN LOCATION - PAIN SEVERITY: 3/10
SUBJECTIVE PAIN PROGRESSION: GRADUALLY IMPROVING
PAIN LOCATION - PAIN QUALITY: SURGICAL
PAIN LOCATION - RELIEVING FACTORS: REST
HIGHEST PAIN SEVERITY IN PAST 24 HOURS: 3/10

## 2024-08-08 ENCOUNTER — HOME CARE VISIT (OUTPATIENT)
Dept: HOME HEALTH SERVICES | Facility: HOME HEALTH | Age: 69
End: 2024-08-08
Payer: MEDICARE

## 2024-08-13 ENCOUNTER — TELEPHONE (OUTPATIENT)
Dept: ORTHOPEDIC SURGERY | Facility: CLINIC | Age: 69
End: 2024-08-13
Payer: MEDICARE

## 2024-08-13 ENCOUNTER — HOME CARE VISIT (OUTPATIENT)
Dept: HOME HEALTH SERVICES | Facility: HOME HEALTH | Age: 69
End: 2024-08-13
Payer: MEDICARE

## 2024-08-13 VITALS — RESPIRATION RATE: 12 BRPM

## 2024-08-13 PROCEDURE — G0151 HHCP-SERV OF PT,EA 15 MIN: HCPCS | Mod: HHH

## 2024-08-13 PROCEDURE — G0158 HHC OT ASSISTANT EA 15: HCPCS | Mod: CO,HHH

## 2024-08-13 SDOH — HEALTH STABILITY: PHYSICAL HEALTH: PHYSICAL EXERCISE: SIT, STAND

## 2024-08-13 SDOH — HEALTH STABILITY: PHYSICAL HEALTH: EXERCISE ACTIVITIES SETS: 1

## 2024-08-13 SDOH — HEALTH STABILITY: PHYSICAL HEALTH: PHYSICAL EXERCISE: 10

## 2024-08-13 SDOH — HEALTH STABILITY: PHYSICAL HEALTH: EXERCISE ACTIVITY: R THA

## 2024-08-13 ASSESSMENT — ACTIVITIES OF DAILY LIVING (ADL)
AMBULATION ASSISTANCE: MINIMUM ASSIST
AMBULATION ASSISTANCE ON UNEVEN SURFACES: 1
AMBULATION ASSISTANCE ON FLAT SURFACES: 1
AMBULATION ASSISTANCE: MODERATE ASSIST
AMBULATION ASSISTANCE: 1
AMBULATION ASSISTANCE: ONE PERSON
PHYSICAL TRANSFERS ASSESSED: 1
CURRENT_FUNCTION: ONE PERSON
CURRENT_FUNCTION: MODERATE ASSIST
CURRENT_FUNCTION: MINIMUM ASSIST

## 2024-08-13 ASSESSMENT — ENCOUNTER SYMPTOMS
HIGHEST PAIN SEVERITY IN PAST 24 HOURS: 3/10
PAIN LOCATION: RIGHT HIP
PAIN LOCATION - PAIN QUALITY: SURGICAL
SUBJECTIVE PAIN PROGRESSION: RAPIDLY IMPROVING
PAIN SEVERITY GOAL: 0/10
LOWEST PAIN SEVERITY IN PAST 24 HOURS: 0/10
LIMITED RANGE OF MOTION: 1
PAIN: 1
PERSON REPORTING PAIN: PATIENT
MUSCLE WEAKNESS: 1

## 2024-08-13 NOTE — TELEPHONE ENCOUNTER
Dr. Hallman is currently out of the office on PTO.     Patient underwent a right total hip arthroplasty on 7/31/24 and his wife called in requesting a refill on his Tramadol 50mg. Can you please send this into the pharmacy on file ([facility])? Thanks

## 2024-08-14 ENCOUNTER — TELEPHONE (OUTPATIENT)
Dept: CARDIOLOGY | Facility: HOSPITAL | Age: 69
End: 2024-08-14
Payer: MEDICARE

## 2024-08-14 ENCOUNTER — PHARMACY VISIT (OUTPATIENT)
Dept: PHARMACY | Facility: CLINIC | Age: 69
End: 2024-08-14
Payer: MEDICARE

## 2024-08-14 DIAGNOSIS — M16.0 PRIMARY OSTEOARTHRITIS OF BOTH HIPS: ICD-10-CM

## 2024-08-14 PROCEDURE — RXMED WILLOW AMBULATORY MEDICATION CHARGE

## 2024-08-14 RX ORDER — TRAMADOL HYDROCHLORIDE 50 MG/1
50 TABLET ORAL EVERY 6 HOURS PRN
Qty: 28 TABLET | Refills: 0 | Status: SHIPPED | OUTPATIENT
Start: 2024-08-14 | End: 2024-08-21

## 2024-08-14 ASSESSMENT — ENCOUNTER SYMPTOMS
PERSON REPORTING PAIN: PATIENT
DENIES PAIN: 1

## 2024-08-14 NOTE — TELEPHONE ENCOUNTER
Called wife back and we currently prescribe him asa and atorvastatin told her that I did not see an issue with these meds, but she needs to call their pharmacist and review all meds with them to check them all.  Asked her if they notified his surgeon and she said that they did call the office.  Also told her that instead of oral med for itching, she could try something topical.     ----- Message from Gayathri CARROLL sent at 8/14/2024 11:25 AM EDT -----  Regarding: Allergic reaction  Patient wife called because he had a hip replacement and he developed a rash all the way up his back and she wants to see if he would be able to take benadryl with his other medications.    Please call when you can, thank you!

## 2024-08-14 NOTE — TELEPHONE ENCOUNTER
Patients wife called back states that the patient has now developed a rash all over his back and is now spreading to his stomach. It started on Saturday 8/10/24. She isn't sure if its from one of the medications that he is taking.   She did also put in a call to his PCP

## 2024-08-15 ENCOUNTER — OFFICE VISIT (OUTPATIENT)
Dept: PRIMARY CARE | Facility: CLINIC | Age: 69
End: 2024-08-15
Payer: MEDICARE

## 2024-08-15 VITALS
OXYGEN SATURATION: 96 % | SYSTOLIC BLOOD PRESSURE: 122 MMHG | HEIGHT: 70 IN | HEART RATE: 56 BPM | BODY MASS INDEX: 29.49 KG/M2 | TEMPERATURE: 98.5 F | WEIGHT: 206 LBS | DIASTOLIC BLOOD PRESSURE: 64 MMHG

## 2024-08-15 DIAGNOSIS — L27.0 DRUG ERUPTION: Primary | ICD-10-CM

## 2024-08-15 PROCEDURE — 3078F DIAST BP <80 MM HG: CPT | Performed by: PHYSICIAN ASSISTANT

## 2024-08-15 PROCEDURE — 1158F ADVNC CARE PLAN TLK DOCD: CPT | Performed by: PHYSICIAN ASSISTANT

## 2024-08-15 PROCEDURE — 3074F SYST BP LT 130 MM HG: CPT | Performed by: PHYSICIAN ASSISTANT

## 2024-08-15 PROCEDURE — 3008F BODY MASS INDEX DOCD: CPT | Performed by: PHYSICIAN ASSISTANT

## 2024-08-15 PROCEDURE — 1159F MED LIST DOCD IN RCRD: CPT | Performed by: PHYSICIAN ASSISTANT

## 2024-08-15 PROCEDURE — 99213 OFFICE O/P EST LOW 20 MIN: CPT | Performed by: PHYSICIAN ASSISTANT

## 2024-08-15 PROCEDURE — 1111F DSCHRG MED/CURRENT MED MERGE: CPT | Performed by: PHYSICIAN ASSISTANT

## 2024-08-15 PROCEDURE — 1123F ACP DISCUSS/DSCN MKR DOCD: CPT | Performed by: PHYSICIAN ASSISTANT

## 2024-08-15 RX ORDER — METHYLPREDNISOLONE 4 MG/1
TABLET ORAL
Qty: 21 TABLET | Refills: 0 | Status: SHIPPED | OUTPATIENT
Start: 2024-08-15

## 2024-08-15 ASSESSMENT — PATIENT HEALTH QUESTIONNAIRE - PHQ9
1. LITTLE INTEREST OR PLEASURE IN DOING THINGS: NOT AT ALL
2. FEELING DOWN, DEPRESSED OR HOPELESS: NOT AT ALL
SUM OF ALL RESPONSES TO PHQ9 QUESTIONS 1 AND 2: 0

## 2024-08-15 NOTE — PROGRESS NOTES
"Subjective   Patient ID: Osmar Pond is a 69 y.o. male who presents for Rash.    HPI      Patient is here for a rash that started 5 days ago. Extremely itchy. It has spread from his back to abdomen, to legs and arms. Sees spots popping up constantly. Had right total hip replacement 7/31/24. Did reach out to cardiology and ortho. Ortho let him know that a rash 2 weeks after surgery would be unusual, and to be evaluated through primary care. Cardiology wanted to make sure that pt checked with pharmacy before taking Benadryl. States that they did check with the pharmacist and he/she said Benadryl and cortisone would be fine to use with current medications. States that there was some improvement of the rash with the itching and appearance when using Benadryl. Rash started on the right side of the back, and has primarily only affected the trunk and groin. Denies any drainage/bleeding. Started Celebrex a few days prior to the rash starting - denies any other environmental changes such as detergent/food/hygiene products/cleaning supplies/pets/etc. No known environmental allergens.    Review of Systems   All other systems reviewed and are negative.      Objective   /64   Pulse 56   Temp 36.9 °C (98.5 °F) (Oral)   Ht 1.778 m (5' 10\")   Wt 93.4 kg (206 lb)   SpO2 96%   BMI 29.56 kg/m²     Physical Exam  Vitals reviewed.   Constitutional:       General: He is awake.      Appearance: Normal appearance. He is well-developed.   HENT:      Head: Normocephalic and atraumatic.   Cardiovascular:      Rate and Rhythm: Normal rate.   Pulmonary:      Effort: Pulmonary effort is normal.   Musculoskeletal:      Cervical back: Full passive range of motion without pain.      Right lower leg: No edema.      Left lower leg: No edema.   Skin:     General: Skin is warm and dry.      Findings: Rash present. No lesion. Rash is macular and papular.      Comments: Erythematous macuopapular rash noted on the low back, spreading up " the back and onto the abdomen. It is blanchable. No drainage/bleeding.    Neurological:      General: No focal deficit present.      Mental Status: He is alert and oriented to person, place, and time.      Cranial Nerves: No facial asymmetry.      Motor: Motor function is intact.      Gait: Gait is intact.      Comments: Pt ambulating with a walker secondary to recent hip replacement.   Psychiatric:         Attention and Perception: Attention normal.         Mood and Affect: Mood and affect normal.       Assessment/Plan   Diagnoses and all orders for this visit:  Drug eruption  -     methylPREDNISolone (Medrol Dospak) 4 mg tablets; Follow schedule on package instructions    Appearance and location of the rash consistent with drug eruption, especially since it did start within a few days of using the Celebrex, which was new for him.  Medrol dosepak Rx - did discuss potential side effects and immune system suppression while taking this - this is why Kenalog would not necessarily be recommended at this time.   Can continue with Benadryl.   Pt did already discontinue the Celebrex. Recommend reaching out to surgery to see if they would like pt to take anything else in its place.  Difficulty breathing or pain - please report to ER

## 2024-08-16 ENCOUNTER — HOME CARE VISIT (OUTPATIENT)
Dept: HOME HEALTH SERVICES | Facility: HOME HEALTH | Age: 69
End: 2024-08-16
Payer: MEDICARE

## 2024-08-16 NOTE — CASE COMMUNICATION
Hi Dr. Hallman,    I set up home physical therapy originally for 2x/wk. Patient is doing excellent with his progress and only requires one visit this week and thus a change to the original plan of care. He will see you this coming Tuesday for scheduled post CLARISA follow up appointment.    Thank you,  Pedro Chaney  Physical Therapist  [facility]

## 2024-08-20 ENCOUNTER — APPOINTMENT (OUTPATIENT)
Dept: ORTHOPEDIC SURGERY | Facility: CLINIC | Age: 69
End: 2024-08-20
Payer: MEDICARE

## 2024-08-20 VITALS — HEIGHT: 70 IN | WEIGHT: 206 LBS | BODY MASS INDEX: 29.49 KG/M2

## 2024-08-20 DIAGNOSIS — M16.0 PRIMARY OSTEOARTHRITIS OF BOTH HIPS: Primary | ICD-10-CM

## 2024-08-20 PROCEDURE — 1111F DSCHRG MED/CURRENT MED MERGE: CPT | Performed by: ORTHOPAEDIC SURGERY

## 2024-08-20 PROCEDURE — 3008F BODY MASS INDEX DOCD: CPT | Performed by: ORTHOPAEDIC SURGERY

## 2024-08-20 PROCEDURE — 1036F TOBACCO NON-USER: CPT | Performed by: ORTHOPAEDIC SURGERY

## 2024-08-20 PROCEDURE — 1159F MED LIST DOCD IN RCRD: CPT | Performed by: ORTHOPAEDIC SURGERY

## 2024-08-20 PROCEDURE — 99024 POSTOP FOLLOW-UP VISIT: CPT | Performed by: ORTHOPAEDIC SURGERY

## 2024-08-20 ASSESSMENT — PAIN - FUNCTIONAL ASSESSMENT: PAIN_FUNCTIONAL_ASSESSMENT: 0-10

## 2024-08-20 ASSESSMENT — PAIN SCALES - GENERAL: PAINLEVEL_OUTOF10: 5 - MODERATE PAIN

## 2024-08-20 NOTE — PROGRESS NOTES
ORTHOPEDIC TOTAL JOINT  POST-OPERATIVE FOLLOW UP      ============================  IMPRESSION/PLAN:  ============================  69 y.o. male s/p Right Total Hip Replacement completed on 7/31/2024    PLAN:  -Weightbearing: Weight bearing as tolerated  -DVT Prophylaxis: Aspirin 81 mg twice daily for 1 more week  -Radiology Studies: None today  -Therapies: Continue PT/OT  -Transition off assistive device as seen fit by patient and therapy  -Follow-up: 7 weeks with x-rays        Osmar Pond presents today for follow up of joint replacement above. Pain controlled with current treatment plan. Patient has begun physical therapy. They are currently doing therapy at home. They are currently ambulating with Walker. He is overall doing well and has no concerns.  Working on transition to a cane    Review of Systems:   Constitutional: See HPI for pain assessment, No significant weight loss, recent trauma. Denies fevers/chills  Cardiovascular: No chest pain, shortness of breath  Respiratory: No difficulty breathing, cough  Gastrointestinal: No nausea, vomiting, diarrhea, constipation  Musculoskeletal: Noted in HPI, no arthralgias   Integumentary: No rashes, easy bruising, redness   Neurological: no numbness or tingling in extremities, no gait disturbances     Patient Active Problem List   Diagnosis    Abnormal cardiovascular function study    Bradycardia by electrocardiography    Abnormal EKG    Acute sinusitis    Allergies    Anxiety    Arthritis    Asymmetrical sensorineural hearing loss    Sensorineural hearing loss, bilateral    Benign paroxysmal positional vertigo    Tinnitus of both ears    Multiple vessel coronary artery disease    Daytime somnolence    Disturbance in sleep behavior    Leukocytosis    Hyperlipidemia    Hypertriglyceridemia    Hypertension    Iliotibial band syndrome of right side    Amnesia    Obstructive sleep apnea syndrome    PVC (premature ventricular contraction)    S/P CABG x 4    Seasonal  allergic rhinitis    Increased glucose level    Syncope    Trochanteric bursitis of right hip    Supraventricular tachycardia, paroxysmal (CMS-HCC)    Ventricular tachycardia (paroxysmal) (Multi)    Vertigo of central origin    Impaired cognition    Overweight with body mass index (BMI) of 28 to 28.9 in adult    Dizziness    Malignant melanoma of upper arm (Multi)    Greater trochanteric bursitis    Benign prostatic hyperplasia with urinary obstruction    Disorder of diaphragm    Disorder of paranasal sinus    Left lower quadrant abdominal pain    Nasal discharge    Rosacea    Sensation of chest tightness    MCI (mild cognitive impairment) with memory loss    Chronic rhinitis    Primary osteoarthritis of both hips    Preoperative cardiovascular examination    Osteoarthritis       =================================  EXAM  =================================  GENERAL: A/Ox3, NAD. Appears healthy, well nourished  CARDIAC: regular rate  LUNGS: Breathing non-labored    MUSCULOSKELETAL:  Laterality: right Hip exam  - Strength: Abduction 5/5, Flexion 5/5  - Palpation: non TTP along surgical site  - Incision: No overlying, erythema, induration, or drainage. Incision healing well with no wound dehiscence  - EHL/PF/DF motor intact  - compartments soft, negative homans  - Gait: using walker    NEUROVASCULAR:  - Neurovascular Status: sensation intact to light touch distally  - Capillary refill brisk at extremities, Bilateral dorsalis pedis pulse 2+     IMAGING: None today      Kanika Hallman DO  Attending Surgeon  Joint Replacement and Adult Reconstructive Surgery  [place], [place]

## 2024-08-20 NOTE — PROGRESS NOTES
PRIMARY CARE PHYSICIAN: Julio Landaverde DO  REFERRING PROVIDER: No referring provider defined for this encounter.     CONSULT ORTHOPAEDIC: Hip Evaluation        ASSESSMENT & PLAN    IMPRESSION:  1.  Primary osteoarthritis, right hip    PLAN:  Discussed with patient findings above.  Reviewed his x-rays with him.  He does have severe arthritis especially medial pole of his right hip and is getting increasingly symptomatic and is affecting his quality of life.  Given that he is feeling improved with conservative treatment would like to discuss the option of surgical intervention.    Osmar Pond has radiographic and physical exam evidence of degenerative joint disease and wishes to pursue surgery. The patient appears to have sufficient symptoms to warrant surgical intervention and is an appropriate candidate for  RIGHT Total Hip Arthroplasty as evidenced by six months of unsuccessful non-operative treatment as outlined in the HPI, progressive symptoms including pain impacting sleep or causing fatigue, pain impacting work, pain worsened with weight bearing, and pain limiting ability to stay fit and healthy.     We had a lengthy discussion regarding the risk and benefit of surgery, the alternatives, limitations, and personnel involved. The include but are not limited to infection, persistent pain, instability, nerve injury, blood clots, and medical complications. We also discussed the pre-operative course, surgery itself, and rehabilitation. Perioperative blood management and transfusion issues were discussed, and options clearly outlined. The patient consented to the use of allogenic blood if medically necessary.     The patient has elected to schedule surgery at this time or intents to call the office with a surgical date. Shared decision making occurred while obtaining informed consent. The patient with be scheduled for a pre-operative education class. The patient will be ordered appropriate preoperative labs  to be completed for preadmission testing.     Robotic Assisted Surgery: No    - Preoperative Consults: PAT Clearance, Cardiac clearance  - Disposition: Extended recovery  - Anesthesia Plan: Spinal, local  - Implants: Tuyet MDM (history of short-term memory issues with Alzheimer's)  - Physical Therapy Plan: Home  - Omon5Tss: Yes  - Surgical Approach: Direct superior  - DVT PPx: Aspirin    Risk Assessment for Post-Op Antibiotics   -None present    I hereby indicate that these comorbidities may have a detrimental effect on this arthroplasty.   -None present      SUBJECTIVE  CHIEF COMPLAINT:   Chief Complaint   Patient presents with    Right Hip - Pain        HPI: Osmar Pond is a 69 y.o. patient. Osmar Pond has had progressive problems with the right hip over the past 1 year. They do not report any trauma. They do report any constant or progressive numbness or tingling in their legs.  Pain is looking on the side of the hip, radiation to the groin and down the anterior aspect of the thigh.  Their symptoms are interfering with activities which include a dull pain that travels to the knee and has severe pain when standing to long. XR done today and 6/26/2024.  Has had significant decline his activities, cannot get through grocery shopping without having pain.  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    FUNCTIONAL STATUS: not limited.  AMBULATORY STATUS:  independent  PREVIOUS TREATMENTS: Cortisone injection around 5/28/2024 with no improvement  Therapy   still taking  Tylenol as needed  HISTORY OF SURGERY ON AFFECTED HIP(S): No   BACK PAIN REPORTED: No       REVIEW OF SYSTEMS  Constitutional: See HPI for pain assessment, No significant weight loss, recent trauma  Cardiovascular: No chest pain, shortness of breath  Respiratory: No difficulty breathing, cough  Gastrointestinal: No nausea, vomiting, diarrhea, constipation  Musculoskeletal: Noted in HPI,  positive for pain, restricted motion, stiffness  Integumentary: No rashes, easy bruising, redness   Neurological: no numbness or tingling in extremities, no gait disturbances   Psychiatric: No mood changes, memory changes, social issues  Heme/Lymph: no excessive swelling, easy bruising, excessive bleeding  ENT: No hearing changes  Eyes: No vision changes    Past Medical History:   Diagnosis Date    Anxiety     Cognitive disorder     SHORT TERM MEMORY    Coronary artery disease     HLD (hyperlipidemia)     PSVT (paroxysmal supraventricular tachycardia) (CMS-HCC)     Vertigo     SINUS        Allergies   Allergen Reactions    Celebrex [Celecoxib] Rash        Past Surgical History:   Procedure Laterality Date    CORONARY ARTERY BYPASS GRAFT  2021    CABG x4 (L-LAD, S-AM and PDA, Radial-OM1)    OTHER SURGICAL HISTORY  11/05/2018    INGUINAL WITH MESH        Family History   Problem Relation Name Age of Onset    Dementia Mother      No Known Problems Father          Social History     Socioeconomic History    Marital status:      Spouse name: Not on file    Number of children: Not on file    Years of education: Not on file    Highest education level: Not on file   Occupational History    Not on file   Tobacco Use    Smoking status: Former     Types: Cigarettes    Smokeless tobacco: Never   Vaping Use    Vaping status: Never Used   Substance and Sexual Activity    Alcohol use: Not Currently    Drug use: Never    Sexual activity: Not on file   Other Topics Concern    Not on file   Social History Narrative    Not on file     Social Determinants of Health     Financial Resource Strain: Low Risk  (7/31/2024)    Overall Financial Resource Strain (CARDIA)     Difficulty of Paying Living Expenses: Not hard at all   Food Insecurity: Not on file   Transportation Needs: No Transportation Needs (8/2/2024)    OASIS : Transportation     Lack of Transportation (Medical): No     Lack of Transportation (Non-Medical): No      Patient Unable or Declines to Respond: No   Physical Activity: Not on file   Stress: Not on file   Social Connections: Feeling Socially Integrated (8/2/2024)    OASIS : Social Isolation     Frequency of experiencing loneliness or isolation: Never   Intimate Partner Violence: Not on file   Housing Stability: Low Risk  (7/31/2024)    Housing Stability Vital Sign     Unable to Pay for Housing in the Last Year: No     Number of Times Moved in the Last Year: 0     Homeless in the Last Year: No        CURRENT MEDICATIONS:   Current Outpatient Medications   Medication Sig Dispense Refill    acetaminophen (Tylenol) 325 mg tablet Take 3 tablets (975 mg) by mouth every 8 hours if needed for mild pain (1 - 3). 90 tablet 1    aspirin 81 mg chewable tablet Chew 1 tablet (81 mg) 2 times a day. To prevent blood clots 60 tablet 0    atorvastatin (Lipitor) 40 mg tablet Take 1 tablet (40 mg) by mouth once daily. 90 tablet 3    cholecalciferol (Vitamin D3) 50 MCG (2000 UT) tablet Take 1 tablet (50 mcg) by mouth once daily.      cyanocobalamin (Vitamin B-12) 1,000 mcg tablet Take 1 tablet (1,000 mcg) by mouth once daily. Takes 5000 mcg      donepezil (Aricept) 5 mg tablet TAKE 1 TABLET BY MOUTH ONCE DAILY AT BEDTIME 30 tablet 0    methylPREDNISolone (Medrol Dospak) 4 mg tablets Follow schedule on package instructions 21 tablet 0    multivitamin (Daily Multi-Vitamin) tablet Take by mouth once daily.      pantoprazole (ProtoNix) 40 mg EC tablet Take 1 tablet (40 mg) by mouth once daily as needed (heartburn). Do not crush, chew, or split. 30 tablet 0    sennosides (Senokot) 8.6 mg tablet Take 1 tablet (8.6 mg) by mouth 2 times a day. To prevent constipation 60 tablet 0    traMADol (Ultram) 50 mg tablet Take 1 tablet (50 mg) by mouth every 6 hours if needed for severe pain (7 - 10) for up to 7 days. 28 tablet 0     No current facility-administered medications for this visit.        OBJECTIVE    PHYSICAL EXAM      8/1/2024     5:17 AM  "8/1/2024     9:02 AM 8/2/2024     9:10 AM 8/6/2024    10:00 AM 8/6/2024    11:35 AM 8/13/2024     9:13 AM 8/15/2024     3:30 PM   Vitals   Systolic 167 134   130  122   Diastolic 88 75   75  64   Heart Rate 56 59   48  56   Temp 36.4 °C (97.5 °F) 37.3 °C (99.1 °F)   37.2 °C (98.9 °F)  36.9 °C (98.5 °F)   Resp 16 18 12 12  12    Height (in)       1.778 m (5' 10\")   Weight (lb)       206   BMI       29.56 kg/m2   BSA (m2)       2.15 m2   Visit Report       Report      There is no height or weight on file to calculate BMI.    GENERAL: A/Ox3, NAD. Appears healthy, well nourished  PSYCHIATRIC: Mood stable, appropriate memory recall  EYES: EOM intact, no scleral icterus  CARDIAC: regular rate  LUNGS: Breathing non-labored  SKIN: no erythema, rashes, or ecchymoses     MUSCULOSKELETAL:  Laterality: right Hip Exam  - ROM, Extension: full, no flexion contracture  - Strength: Abduction 5/5 against resitance, Flexion 5/5  - Palpation: mild TTP along greater trochanter  - Log roll/IR exam: painful and limited motion. Pain with hip flexion past 90 degrees and internal rotation  - Straight leg raise: negative  - EHL/PF/DF motor intact  - Gait: antalgic to arthritic side, negative Trendelenburg gait   - Special Tests: none performed    NEUROVASCULAR:  - Neurovascular Status: sensation intact to light touch distally  - Capillary refill brisk at extremities, Bilateral dorsalis pedis pulse 2+       IMAGING:  Multiple views of the affected right hip(s) demonstrate: Severe arhritic changes noted especially medial compartment complete joint space narrowing, calcification of the fat pad, marginal Citic changes subchondral sclerosis.   X-rays were personally reviewed and interpreted by me.  Radiology reports were reviewed by me as well, if readily available at the time.        Kanika Hallman DO  Attending Surgeon  Joint Replacement and Adult Reconstructive Surgery  [place], [place]                         "

## 2024-08-21 ENCOUNTER — HOME CARE VISIT (OUTPATIENT)
Dept: HOME HEALTH SERVICES | Facility: HOME HEALTH | Age: 69
End: 2024-08-21
Payer: MEDICARE

## 2024-08-21 VITALS — RESPIRATION RATE: 12 BRPM

## 2024-08-21 PROCEDURE — G0151 HHCP-SERV OF PT,EA 15 MIN: HCPCS | Mod: HHH

## 2024-08-21 SDOH — HEALTH STABILITY: PHYSICAL HEALTH: EXERCISE ACTIVITY: THA

## 2024-08-21 SDOH — HEALTH STABILITY: PHYSICAL HEALTH: EXERCISE ACTIVITIES SETS: 2

## 2024-08-21 SDOH — HEALTH STABILITY: PHYSICAL HEALTH: PHYSICAL EXERCISE: 15

## 2024-08-21 SDOH — HEALTH STABILITY: PHYSICAL HEALTH: PHYSICAL EXERCISE: SIT, RECLINED, STAND

## 2024-08-21 ASSESSMENT — ENCOUNTER SYMPTOMS
PAIN: 1
PAIN LOCATION - PAIN QUALITY: SURGICAL
PAIN LOCATION: RIGHT HIP
HIGHEST PAIN SEVERITY IN PAST 24 HOURS: 2/10
PAIN SEVERITY GOAL: 0/10
PERSON REPORTING PAIN: PATIENT
LOWEST PAIN SEVERITY IN PAST 24 HOURS: 0/10

## 2024-08-21 ASSESSMENT — ACTIVITIES OF DAILY LIVING (ADL)
OASIS_M1830: 00
AMBULATION ASSISTANCE: INDEPENDENT
AMBULATION ASSISTANCE: 1
PHYSICAL TRANSFERS ASSESSED: 1
CURRENT_FUNCTION: INDEPENDENT
HOME_HEALTH_OASIS: 00

## 2024-08-22 ENCOUNTER — OFFICE VISIT (OUTPATIENT)
Dept: NEUROLOGY | Facility: HOSPITAL | Age: 69
End: 2024-08-22
Payer: MEDICARE

## 2024-08-22 ENCOUNTER — TELEPHONE (OUTPATIENT)
Dept: PRIMARY CARE | Facility: CLINIC | Age: 69
End: 2024-08-22

## 2024-08-22 VITALS
BODY MASS INDEX: 28.7 KG/M2 | SYSTOLIC BLOOD PRESSURE: 140 MMHG | HEART RATE: 87 BPM | DIASTOLIC BLOOD PRESSURE: 73 MMHG | WEIGHT: 200 LBS

## 2024-08-22 DIAGNOSIS — G31.84 MCI (MILD COGNITIVE IMPAIRMENT) WITH MEMORY LOSS: Primary | ICD-10-CM

## 2024-08-22 PROCEDURE — 3077F SYST BP >= 140 MM HG: CPT | Performed by: NURSE PRACTITIONER

## 2024-08-22 PROCEDURE — 1159F MED LIST DOCD IN RCRD: CPT | Performed by: NURSE PRACTITIONER

## 2024-08-22 PROCEDURE — 1111F DSCHRG MED/CURRENT MED MERGE: CPT | Performed by: NURSE PRACTITIONER

## 2024-08-22 PROCEDURE — 99214 OFFICE O/P EST MOD 30 MIN: CPT | Performed by: NURSE PRACTITIONER

## 2024-08-22 PROCEDURE — 1036F TOBACCO NON-USER: CPT | Performed by: NURSE PRACTITIONER

## 2024-08-22 PROCEDURE — 1160F RVW MEDS BY RX/DR IN RCRD: CPT | Performed by: NURSE PRACTITIONER

## 2024-08-22 PROCEDURE — 3078F DIAST BP <80 MM HG: CPT | Performed by: NURSE PRACTITIONER

## 2024-08-22 PROCEDURE — 1125F AMNT PAIN NOTED PAIN PRSNT: CPT | Performed by: NURSE PRACTITIONER

## 2024-08-22 RX ORDER — DONEPEZIL HYDROCHLORIDE 10 MG/1
10 TABLET, FILM COATED ORAL NIGHTLY
Qty: 30 TABLET | Refills: 11 | Status: SHIPPED | OUTPATIENT
Start: 2024-08-22 | End: 2025-08-22

## 2024-08-22 ASSESSMENT — PAIN SCALES - GENERAL: PAINLEVEL: 8

## 2024-08-22 ASSESSMENT — ENCOUNTER SYMPTOMS
LOSS OF SENSATION IN FEET: 0
OCCASIONAL FEELINGS OF UNSTEADINESS: 0

## 2024-08-22 NOTE — TELEPHONE ENCOUNTER
Reviewed provider, he stated patient may come into the office to have it looked at or if its improving, he may take antihistamines like Zyrtec or Claritin instead of benadryl and if it continues to spread or not in a week, he needs to schedule appointment.  Spoke with patients wife. She stated the rash is improving but still all over and itchy. Is taking Benadryl and applying Cortisone cream. Patient informed of the above and verbally understands.

## 2024-08-22 NOTE — TELEPHONE ENCOUNTER
Patient's wife Radha called and stated patient has finished the medrol dose pack and is still currently taking benadryl but still has an itchy rash on his back. She is wanting to know if he needs to come back in and be seen of if there is an additional medication they can try. He was seen on 8/15 and was treated for a drug eruption after starting Celebrex which was discontinued. Please discuss with TJS and advise patient.

## 2024-08-22 NOTE — PATIENT INSTRUCTIONS
Memory:   Tips for brain health include:  - Try to follow a brain healthy diet (Mediterranean diet) including green leafy vegetables, antioxidant rich foods (such as berries), fish and olive oil. Avoid red meat, fried foods, and processed sugars. Limit alcohol intake.  - Participate in brain exercises daily such as Lumosity, word puzzles, word games and card games.  - Keep a notepad handy to write things down and make lists, use frequently as a reference.  - Engage in 20-30 minutes of daily physical exercise, such as walking.  - Ensure 7-9 hours of sleep each night.  - Increase social engagement activities and strong support systems.  - If feeling withdrawn or depressed, seek help.  - Prevagen (apoaequorin) is a supplement available over the counter that may help with memory loss, though it is not FDA regulated and the evidence of improvement is limited in current studies. There is no generic and this is not a prescription medication so the costs are relatively expensive for patients.

## 2024-08-22 NOTE — PROGRESS NOTES
"[facility]    SUBJECTIVE    Osmar Pond is a 69 y.o. right-handed male who presents with   Chief Complaint   Patient presents with    Memory Loss        Presents for follow up visit  Visit type: in-clinic visit    HISTORY OF PRESENT ILLNESS    RECAP:  Former patient of Dr. Mendez, last visit May 2024.   Referred by PCP \"ASAP\" to neurology for memory problems x2.5 years. Was started on memantine by PCP but insurance wouldn't cover. Was started on donepezil instead.      Hearing poor.     S/p CABG ~3 years ago. Since then memory has been bad. Short term memory is troublesome. Lives with wife. Driving. Not getting lost. Denies problems driving. Not leaving the faucet on or stove on. Bills on autopay. No hallucinations.     Tried donepezil for a few days, \"head felt funny\". ? Dizzy. He stopped it.     No sz. No stroke. Balance ok. No falls.      Hearing bad for 2 years. Says hearing aids are a joke--not happy with them.     No B12/TSH. Not diabetic.     5/2022 MRI IAC wwo done ok, there was some vol loss noted. Nothing significant happened medically since then.     SLUMS 10/30 on 5/22/24     Denies anxiety/depression. Was a .     Mother had dementia symptoms at age 60s, passed away at 68 yo.     Former smoker. Drank alcohol, last was 33 years ago.      Pt/spouse did not disclose but found on review of records after visit, pt had seen Dr Perez in [facility] in 2022 for same problem. Was referred for neuropsych testing-->dx MCI with memory loss. Was advised follow-up with neurologist, but appears to not have followed up.    Pt prescribed donepezil by PCP--had perceived SE and stopped     We discussed about available memory pills and memory IV medication (and possible referral to memory clinic if interested in latter). Realistic expectations discussed. Pt/spouse wants to retry donepezil. States they still have it at home--? Dose. Try donepezil 5mg at bedtime x1 mo, then if no SE, plan go up " to 10mg at bedtime    24 - Presents with wife for today's visit.     B12 elevated, TSH wnl.    Pt and wife feel he is doing well overall.     Did hip surgery for replacement 21 days ago. Healing well per pt. Walking with walker only when he tends to over exert himself. Typically very active and does not like to remain sedentary.     Doing well on donepezil. Taking at night. No insomnia or vivid dreams. No diarrhea.     Mainly short term memory issues, no long term issues.     Mother had early onset of dementia in her 50s,  at age 67. States hers progressed very rapidly.    REVIEW OF SYSTEMS:  10 point review of systems performed and is negative except as noted in the HPI.     Past Medical History:   Diagnosis Date    Anxiety     Cognitive disorder     SHORT TERM MEMORY    Coronary artery disease     HLD (hyperlipidemia)     PSVT (paroxysmal supraventricular tachycardia) (CMS-HCC)     Vertigo     SINUS       Family medical history includes dementia in mother.    Past Surgical History:   Procedure Laterality Date    CORONARY ARTERY BYPASS GRAFT      CABG x4 (L-LAD, S-AM and PDA, Radial-OM1)    OTHER SURGICAL HISTORY  2018    INGUINAL WITH MESH       Social History     Substance and Sexual Activity   Drug Use Never     Tobacco Use: Medium Risk (2024)    Patient History     Smoking Tobacco Use: Former     Smokeless Tobacco Use: Never     Passive Exposure: Not on file     Alcohol Use: Not At Risk (2024)    AUDIT-C     Frequency of Alcohol Consumption: Never     Average Number of Drinks: Patient does not drink     Frequency of Binge Drinking: Never       Current Outpatient Medications   Medication Instructions    acetaminophen (TYLENOL) 975 mg, oral, Every 8 hours PRN    aspirin 81 mg, oral, 2 times daily, To prevent blood clots    atorvastatin (LIPITOR) 40 mg, oral, Daily    cholecalciferol (VITAMIN D3) 50 mcg, oral, Daily    cyanocobalamin (VITAMIN B-12) 1,000 mcg, oral, Daily, Takes 5000  mcg    donepezil (ARICEPT) 10 mg, oral, Nightly    methylPREDNISolone (Medrol Dospak) 4 mg tablets Follow schedule on package instructions    multivitamin (Daily Multi-Vitamin) tablet oral, Daily RT    pantoprazole (PROTONIX) 40 mg, oral, Daily PRN, Do not crush, chew, or split.    senna 8.6 mg, oral, 2 times daily, To prevent constipation         OBJECTIVE    /73   Pulse 87   Wt 90.7 kg (200 lb)   BMI 28.70 kg/m²       Physical Exam  Psychiatric:         Speech: Speech normal.       Neurological Exam  Mental Status  Awake, alert and oriented to person, place and time. Recent and remote memory are intact. Speech is normal. Language is fluent with no aphasia. Attention and concentration are normal. Fund of knowledge is appropriate for level of education.    Cranial Nerves  CN II-XII grossly intact.    Motor  Normal muscle bulk throughout. No fasciculations present. Normal muscle tone. No abnormal involuntary movements.  Strength at least antigravity throughout.    Sensory  Light touch is normal in upper and lower extremities.     Coordination  Right: Finger-to-nose normal.Left: Finger-to-nose normal.    Gait  Casual gait is normal including stance, stride, and arm swing.        MR BRAIN W AND WO IV CONTRAST 10/08/2020    Narrative  MRN: [id number]  Patient Name: MICHAEL BLOUNT    STUDY:  MRI BRAIN W/WO CONTRAST;  10/8/2020 10:28 am    INDICATION:  syncopal episode.    COMPARISON:  None.    ACCESSION NUMBER(S):  [id number]    ORDERING CLINICIAN:  ERI GODFREY    TECHNIQUE:  The brain was studied in the sagittal axial and coronal planes  utilizing FLAIR, T1 and T2 weighted images    FINDINGS:  There is slight prominence of the cortical sulci and sylvian  fissures. There is mild ventricular dilatation.  There are a few  scattered foci of abnormal signal within the basal ganglia and  subcortical white matter bilaterally.  These are compatible with  minimal small vessel ischemic changes.  These nonspecific  findings  could also be produced by a demyelinating or post inflammatory  process.  The visualized skull base paranasal sinuses and orbital  structures are unremarkable. Diffusion weighted images and associated  ADC maps of the brain were unremarkable.  There is no evidence of  diffusion restriction to suggest the presence of acute infarction.  Gradient echo T2 weighted images fail to demonstrate hemosiderin  deposition or other evidence of hemorrhage. Following intravenous  injection of ,there is no abnormal enhancement. There is normal  contrast opacification of the dural venous sinuses.    IMPRESSION  * There is no evidence of mass, cerebral infarction or hemorrhage.    THIS EXAMINATION WAS INTERPRETED AT [facility]      Lab Results   Component Value Date    WBC 10.3 07/24/2024    RBC 4.78 07/24/2024    HGB 15.1 07/24/2024    HCT 43.4 07/24/2024     07/24/2024     07/24/2024    K 4.3 07/24/2024     07/24/2024    BUN 14 07/24/2024    CREATININE 0.79 07/24/2024    EGFR >90 07/24/2024    CALCIUM 9.7 07/24/2024    ALKPHOS 68 11/15/2023    AST 32 11/15/2023    ALT 29 11/15/2023    MG 2.11 11/28/2021    [id number] 2,323 (H) 05/22/2024    LDLCALC 70 11/15/2023    CHOL 159 11/15/2023    HDL 71.6 11/15/2023    TRIG 85 11/15/2023    TSH 1.46 05/22/2024          ASSESSMENT & PLAN  Problem List Items Addressed This Visit       MCI (mild cognitive impairment) with memory loss - Primary    Overview     SLUMS 10/30 on 5/22/24  Pt states only problem is short term memory  Cannot exclude contribution of hearing problem to memory issue  2022 MRI IAC wwo with mild to mod vol loss  B12 and TSH not issue  On donepezil 10 mg         Current Assessment & Plan     Tolerating donepezil 5 mg nightly, increase to 10 mg. If experiencing any insomnia or vivid dreams, take in am instead. If diarrhea or GI issues, can go back down to 5 mg. Refills sent.   Stable progression.   Discussed lifestyle modifications to help with memory  loss.          Relevant Medications    donepezil (Aricept) 10 mg tablet    Other Relevant Orders    Follow Up In Neurology       FU in 6 months         Jolie Schmitt, APRN-CNP

## 2024-09-03 ENCOUNTER — TELEPHONE (OUTPATIENT)
Dept: ORTHOPEDIC SURGERY | Facility: HOSPITAL | Age: 69
End: 2024-09-03
Payer: MEDICARE

## 2024-09-03 DIAGNOSIS — M16.0 PRIMARY OSTEOARTHRITIS OF BOTH HIPS: Primary | ICD-10-CM

## 2024-09-03 PROCEDURE — RXMED WILLOW AMBULATORY MEDICATION CHARGE

## 2024-09-03 RX ORDER — TRAMADOL HYDROCHLORIDE 50 MG/1
50 TABLET ORAL EVERY 6 HOURS PRN
Qty: 28 TABLET | Refills: 0 | Status: SHIPPED | OUTPATIENT
Start: 2024-09-03 | End: 2024-09-11

## 2024-09-03 NOTE — TELEPHONE ENCOUNTER
Patients wife called in asking to refill tramadol for patient, states he has no pills left. Had RIGHT Total Hip Arthroplasty surgery 7/31/24.  Please advise.     Pharmacy: [facility]

## 2024-09-04 ENCOUNTER — PHARMACY VISIT (OUTPATIENT)
Dept: PHARMACY | Facility: CLINIC | Age: 69
End: 2024-09-04
Payer: MEDICARE

## 2024-10-07 NOTE — PROGRESS NOTES
ORTHOPEDIC TOTAL JOINT  POST-OPERATIVE FOLLOW UP      ============================  IMPRESSION/PLAN:  ============================  69 y.o. male s/p Right Total Hip Replacement completed on 7/31/2024    PLAN:  -Weightbearing: Weight bearing as tolerated  -DVT Prophylaxis: No longer needed  -Radiology Studies: Reviewed today  -Therapies: Continue regular exercise program  -Follow-up with the 1 year asia of surgery with x-rays        Osmar Pond presents today for follow up of joint replacement above. Pain controlled with current treatment plan. Patient has completed physical therapy. They are currently ambulating with  no assistance . He is overall doing well and has no concerns. XR done today.    Review of Systems:   Constitutional: See HPI for pain assessment, No significant weight loss, recent trauma. Denies fevers/chills  Cardiovascular: No chest pain, shortness of breath  Respiratory: No difficulty breathing, cough  Gastrointestinal: No nausea, vomiting, diarrhea, constipation  Musculoskeletal: Noted in HPI, no arthralgias   Integumentary: No rashes, easy bruising, redness   Neurological: no numbness or tingling in extremities, no gait disturbances     Patient Active Problem List   Diagnosis    Abnormal cardiovascular function study    Bradycardia by electrocardiography    Abnormal EKG    Acute sinusitis    Allergies    Anxiety    Arthritis    Asymmetrical sensorineural hearing loss    Sensorineural hearing loss, bilateral    Benign paroxysmal positional vertigo    Tinnitus of both ears    Multiple vessel coronary artery disease    Daytime somnolence    Disturbance in sleep behavior    Leukocytosis    Hyperlipidemia    Hypertriglyceridemia    Hypertension    Iliotibial band syndrome of right side    Amnesia    Obstructive sleep apnea syndrome    PVC (premature ventricular contraction)    S/P CABG x 4    Seasonal allergic rhinitis    Increased glucose level    Syncope    Trochanteric bursitis of right hip     Supraventricular tachycardia, paroxysmal (CMS-HCC)    Ventricular tachycardia (paroxysmal) (Multi)    Vertigo of central origin    Impaired cognition    Overweight with body mass index (BMI) of 28 to 28.9 in adult    Dizziness    Malignant melanoma of upper arm (Multi)    Greater trochanteric bursitis    Benign prostatic hyperplasia with urinary obstruction    Disorder of diaphragm    Disorder of paranasal sinus    Left lower quadrant abdominal pain    Nasal discharge    Rosacea    Sensation of chest tightness    MCI (mild cognitive impairment) with memory loss    Chronic rhinitis    Primary osteoarthritis of both hips    Preoperative cardiovascular examination    Osteoarthritis       =================================  EXAM  =================================  GENERAL: A/Ox3, NAD. Appears healthy, well nourished  CARDIAC: regular rate  LUNGS: Breathing non-labored    MUSCULOSKELETAL:  Laterality: right Hip exam  - Strength: Abduction 5/5, Flexion 5/5  - Palpation: non TTP along surgical site  - Incision: No overlying, erythema, induration, or drainage. Incision healing well with no wound dehiscence  - EHL/PF/DF motor intact  - compartments soft, negative homans  - Gait: using no assistive device    NEUROVASCULAR:  - Neurovascular Status: sensation intact to light touch distally  - Capillary refill brisk at extremities, Bilateral dorsalis pedis pulse 2+     IMAGING: Multi views of right hip and pelvis reviewed today which demonstrates no signs of loosening or failure right total hip arthroplasty. X-rays were personally reviewed by me.  Radiology reports were reviewed by me as well, if available at the time.        Kanika Hallman DO  Attending Surgeon  Joint Replacement and Adult Reconstructive Surgery  [place], [place]

## 2024-10-08 ENCOUNTER — OFFICE VISIT (OUTPATIENT)
Dept: ORTHOPEDIC SURGERY | Facility: CLINIC | Age: 69
End: 2024-10-08
Payer: MEDICARE

## 2024-10-08 ENCOUNTER — APPOINTMENT (OUTPATIENT)
Dept: PRIMARY CARE | Facility: CLINIC | Age: 69
End: 2024-10-08
Payer: MEDICARE

## 2024-10-08 ENCOUNTER — HOSPITAL ENCOUNTER (OUTPATIENT)
Dept: RADIOLOGY | Facility: CLINIC | Age: 69
Discharge: HOME | End: 2024-10-08
Payer: MEDICARE

## 2024-10-08 VITALS — HEIGHT: 70 IN | WEIGHT: 200 LBS | BODY MASS INDEX: 28.63 KG/M2

## 2024-10-08 DIAGNOSIS — M70.61 TROCHANTERIC BURSITIS OF RIGHT HIP: ICD-10-CM

## 2024-10-08 DIAGNOSIS — M16.0 PRIMARY OSTEOARTHRITIS OF BOTH HIPS: ICD-10-CM

## 2024-10-08 PROCEDURE — 1159F MED LIST DOCD IN RCRD: CPT | Performed by: ORTHOPAEDIC SURGERY

## 2024-10-08 PROCEDURE — 3008F BODY MASS INDEX DOCD: CPT | Performed by: ORTHOPAEDIC SURGERY

## 2024-10-08 PROCEDURE — 73502 X-RAY EXAM HIP UNI 2-3 VIEWS: CPT | Mod: RT

## 2024-10-08 PROCEDURE — 73502 X-RAY EXAM HIP UNI 2-3 VIEWS: CPT | Mod: RIGHT SIDE | Performed by: RADIOLOGY

## 2024-10-08 PROCEDURE — 99024 POSTOP FOLLOW-UP VISIT: CPT | Performed by: ORTHOPAEDIC SURGERY

## 2024-10-08 PROCEDURE — 1036F TOBACCO NON-USER: CPT | Performed by: ORTHOPAEDIC SURGERY

## 2024-10-08 ASSESSMENT — PAIN - FUNCTIONAL ASSESSMENT: PAIN_FUNCTIONAL_ASSESSMENT: NO/DENIES PAIN

## 2024-12-09 ENCOUNTER — LAB (OUTPATIENT)
Dept: LAB | Facility: LAB | Age: 69
End: 2024-12-09
Payer: MEDICARE

## 2024-12-09 DIAGNOSIS — Z13.1 SCREENING FOR DIABETES MELLITUS: ICD-10-CM

## 2024-12-09 DIAGNOSIS — Z86.39 HISTORY OF HYPERLIPIDEMIA, MIXED: ICD-10-CM

## 2024-12-09 DIAGNOSIS — Z11.59 ENCOUNTER FOR HEPATITIS C SCREENING TEST FOR LOW RISK PATIENT: ICD-10-CM

## 2024-12-09 DIAGNOSIS — E78.5 HYPERLIPIDEMIA, UNSPECIFIED HYPERLIPIDEMIA TYPE: ICD-10-CM

## 2024-12-09 DIAGNOSIS — Z12.5 SCREENING FOR PROSTATE CANCER: ICD-10-CM

## 2024-12-09 DIAGNOSIS — Z79.899 MEDICATION MANAGEMENT: ICD-10-CM

## 2024-12-09 LAB
ALBUMIN SERPL BCP-MCNC: 4.1 G/DL (ref 3.4–5)
ALP SERPL-CCNC: 64 U/L (ref 33–136)
ALT SERPL W P-5'-P-CCNC: 23 U/L (ref 10–52)
ANION GAP SERPL CALC-SCNC: 11 MMOL/L (ref 10–20)
AST SERPL W P-5'-P-CCNC: 29 U/L (ref 9–39)
BILIRUB SERPL-MCNC: 0.7 MG/DL (ref 0–1.2)
BUN SERPL-MCNC: 14 MG/DL (ref 6–23)
CALCIUM SERPL-MCNC: 9.7 MG/DL (ref 8.6–10.3)
CHLORIDE SERPL-SCNC: 108 MMOL/L (ref 98–107)
CHOLEST SERPL-MCNC: 143 MG/DL (ref 0–199)
CHOLESTEROL/HDL RATIO: 2.1
CO2 SERPL-SCNC: 25 MMOL/L (ref 21–32)
CREAT SERPL-MCNC: 0.86 MG/DL (ref 0.5–1.3)
EGFRCR SERPLBLD CKD-EPI 2021: >90 ML/MIN/1.73M*2
ERYTHROCYTE [DISTWIDTH] IN BLOOD BY AUTOMATED COUNT: 12.9 % (ref 11.5–14.5)
EST. AVERAGE GLUCOSE BLD GHB EST-MCNC: 117 MG/DL
GLUCOSE SERPL-MCNC: 91 MG/DL (ref 74–99)
HBA1C MFR BLD: 5.7 %
HCT VFR BLD AUTO: 45.8 % (ref 41–52)
HCV AB SER QL: NONREACTIVE
HDLC SERPL-MCNC: 66.6 MG/DL
HGB BLD-MCNC: 15.1 G/DL (ref 13.5–17.5)
LDLC SERPL CALC-MCNC: 58 MG/DL
MCH RBC QN AUTO: 30 PG (ref 26–34)
MCHC RBC AUTO-ENTMCNC: 33 G/DL (ref 32–36)
MCV RBC AUTO: 91 FL (ref 80–100)
NON HDL CHOLESTEROL: 76 MG/DL (ref 0–149)
NRBC BLD-RTO: 0 /100 WBCS (ref 0–0)
PLATELET # BLD AUTO: 265 X10*3/UL (ref 150–450)
POTASSIUM SERPL-SCNC: 4.4 MMOL/L (ref 3.5–5.3)
PROT SERPL-MCNC: 6.5 G/DL (ref 6.4–8.2)
PSA SERPL-MCNC: 2.13 NG/ML
RBC # BLD AUTO: 5.03 X10*6/UL (ref 4.5–5.9)
SODIUM SERPL-SCNC: 140 MMOL/L (ref 136–145)
TRIGL SERPL-MCNC: 92 MG/DL (ref 0–149)
VLDL: 18 MG/DL (ref 0–40)
WBC # BLD AUTO: 8.7 X10*3/UL (ref 4.4–11.3)

## 2024-12-09 PROCEDURE — 85027 COMPLETE CBC AUTOMATED: CPT

## 2024-12-09 PROCEDURE — 83036 HEMOGLOBIN GLYCOSYLATED A1C: CPT

## 2024-12-09 PROCEDURE — 80061 LIPID PANEL: CPT

## 2024-12-09 PROCEDURE — 80053 COMPREHEN METABOLIC PANEL: CPT

## 2024-12-09 PROCEDURE — 36415 COLL VENOUS BLD VENIPUNCTURE: CPT

## 2024-12-09 PROCEDURE — G0103 PSA SCREENING: HCPCS

## 2024-12-09 PROCEDURE — G0472 HEP C SCREEN HIGH RISK/OTHER: HCPCS

## 2024-12-10 ENCOUNTER — APPOINTMENT (OUTPATIENT)
Dept: PRIMARY CARE | Facility: CLINIC | Age: 69
End: 2024-12-10
Payer: MEDICARE

## 2024-12-10 VITALS
HEIGHT: 70 IN | SYSTOLIC BLOOD PRESSURE: 120 MMHG | BODY MASS INDEX: 28.92 KG/M2 | TEMPERATURE: 98 F | OXYGEN SATURATION: 96 % | DIASTOLIC BLOOD PRESSURE: 60 MMHG | HEART RATE: 74 BPM | WEIGHT: 202 LBS

## 2024-12-10 DIAGNOSIS — Z12.5 SCREENING FOR PROSTATE CANCER: ICD-10-CM

## 2024-12-10 DIAGNOSIS — I25.10 CAD, MULTIPLE VESSEL: ICD-10-CM

## 2024-12-10 DIAGNOSIS — Z95.1 S/P CABG X 4: ICD-10-CM

## 2024-12-10 DIAGNOSIS — Z11.59 ENCOUNTER FOR HEPATITIS C SCREENING TEST FOR LOW RISK PATIENT: ICD-10-CM

## 2024-12-10 DIAGNOSIS — Z00.00 ROUTINE GENERAL MEDICAL EXAMINATION AT A HEALTH CARE FACILITY: Primary | ICD-10-CM

## 2024-12-10 DIAGNOSIS — Z13.1 SCREENING FOR DIABETES MELLITUS: ICD-10-CM

## 2024-12-10 DIAGNOSIS — E78.1 HYPERTRIGLYCERIDEMIA: ICD-10-CM

## 2024-12-10 DIAGNOSIS — E78.5 HYPERLIPIDEMIA, UNSPECIFIED HYPERLIPIDEMIA TYPE: ICD-10-CM

## 2024-12-10 DIAGNOSIS — Z79.899 MEDICATION MANAGEMENT: ICD-10-CM

## 2024-12-10 PROCEDURE — 99397 PER PM REEVAL EST PAT 65+ YR: CPT | Performed by: FAMILY MEDICINE

## 2024-12-10 PROCEDURE — 3078F DIAST BP <80 MM HG: CPT | Performed by: FAMILY MEDICINE

## 2024-12-10 PROCEDURE — 1170F FXNL STATUS ASSESSED: CPT | Performed by: FAMILY MEDICINE

## 2024-12-10 PROCEDURE — 1036F TOBACCO NON-USER: CPT | Performed by: FAMILY MEDICINE

## 2024-12-10 PROCEDURE — 3074F SYST BP LT 130 MM HG: CPT | Performed by: FAMILY MEDICINE

## 2024-12-10 PROCEDURE — 1158F ADVNC CARE PLAN TLK DOCD: CPT | Performed by: FAMILY MEDICINE

## 2024-12-10 PROCEDURE — 3008F BODY MASS INDEX DOCD: CPT | Performed by: FAMILY MEDICINE

## 2024-12-10 PROCEDURE — 99213 OFFICE O/P EST LOW 20 MIN: CPT | Performed by: FAMILY MEDICINE

## 2024-12-10 PROCEDURE — 1159F MED LIST DOCD IN RCRD: CPT | Performed by: FAMILY MEDICINE

## 2024-12-10 PROCEDURE — 1123F ACP DISCUSS/DSCN MKR DOCD: CPT | Performed by: FAMILY MEDICINE

## 2024-12-10 PROCEDURE — G0439 PPPS, SUBSEQ VISIT: HCPCS | Performed by: FAMILY MEDICINE

## 2024-12-10 RX ORDER — ATORVASTATIN CALCIUM 40 MG/1
40 TABLET, FILM COATED ORAL DAILY
Qty: 90 TABLET | Refills: 1 | Status: SHIPPED | OUTPATIENT
Start: 2024-12-10 | End: 2025-06-08

## 2024-12-10 ASSESSMENT — VISUAL ACUITY
OD_CC: 20/20
OS_CC: 20/25

## 2024-12-10 ASSESSMENT — PATIENT HEALTH QUESTIONNAIRE - PHQ9
1. LITTLE INTEREST OR PLEASURE IN DOING THINGS: NOT AT ALL
SUM OF ALL RESPONSES TO PHQ9 QUESTIONS 1 AND 2: 0
2. FEELING DOWN, DEPRESSED OR HOPELESS: NOT AT ALL

## 2024-12-10 ASSESSMENT — ENCOUNTER SYMPTOMS
LOSS OF SENSATION IN FEET: 0
DEPRESSION: 0
OCCASIONAL FEELINGS OF UNSTEADINESS: 0

## 2024-12-10 ASSESSMENT — ACTIVITIES OF DAILY LIVING (ADL)
TAKING_MEDICATION: INDEPENDENT
DRESSING: INDEPENDENT
MANAGING_FINANCES: INDEPENDENT
DOING_HOUSEWORK: INDEPENDENT
BATHING: INDEPENDENT
GROCERY_SHOPPING: INDEPENDENT

## 2025-02-08 NOTE — TELEPHONE ENCOUNTER
----- Message from Nurse Dejah SIDHU sent at 2/7/2025  6:52 PM EST -----  Regarding: Refill  Patient is requesting a refill of atorvastatin 40 mg

## 2025-02-21 ENCOUNTER — OFFICE VISIT (OUTPATIENT)
Dept: NEUROLOGY | Facility: HOSPITAL | Age: 70
End: 2025-02-21
Payer: MEDICARE

## 2025-02-21 VITALS
BODY MASS INDEX: 29.82 KG/M2 | DIASTOLIC BLOOD PRESSURE: 94 MMHG | HEART RATE: 58 BPM | WEIGHT: 207.8 LBS | SYSTOLIC BLOOD PRESSURE: 132 MMHG

## 2025-02-21 DIAGNOSIS — G31.84 MCI (MILD COGNITIVE IMPAIRMENT) WITH MEMORY LOSS: Primary | ICD-10-CM

## 2025-02-21 PROCEDURE — 3080F DIAST BP >= 90 MM HG: CPT | Performed by: NURSE PRACTITIONER

## 2025-02-21 PROCEDURE — 99214 OFFICE O/P EST MOD 30 MIN: CPT | Performed by: NURSE PRACTITIONER

## 2025-02-21 PROCEDURE — 1160F RVW MEDS BY RX/DR IN RCRD: CPT | Performed by: NURSE PRACTITIONER

## 2025-02-21 PROCEDURE — 1036F TOBACCO NON-USER: CPT | Performed by: NURSE PRACTITIONER

## 2025-02-21 PROCEDURE — 1126F AMNT PAIN NOTED NONE PRSNT: CPT | Performed by: NURSE PRACTITIONER

## 2025-02-21 PROCEDURE — 3075F SYST BP GE 130 - 139MM HG: CPT | Performed by: NURSE PRACTITIONER

## 2025-02-21 PROCEDURE — 1159F MED LIST DOCD IN RCRD: CPT | Performed by: NURSE PRACTITIONER

## 2025-02-21 RX ORDER — MEMANTINE HYDROCHLORIDE 5 MG/1
5 TABLET ORAL 2 TIMES DAILY
Qty: 60 TABLET | Refills: 11 | Status: SHIPPED | OUTPATIENT
Start: 2025-02-21 | End: 2026-02-21

## 2025-02-21 ASSESSMENT — ENCOUNTER SYMPTOMS
OCCASIONAL FEELINGS OF UNSTEADINESS: 0
DEPRESSION: 0
LOSS OF SENSATION IN FEET: 0

## 2025-02-21 ASSESSMENT — PAIN SCALES - GENERAL: PAINLEVEL_OUTOF10: 0-NO PAIN

## 2025-02-21 NOTE — PROGRESS NOTES
"[facility]    SUBJECTIVE    Osmar Pond is a 69 y.o. right-handed male who presents with   Chief Complaint   Patient presents with    Memory Loss        Presents for follow up visit  Visit type: in-clinic visit    HISTORY OF PRESENT ILLNESS    RECAP:  Former patient of Dr. Mendez, last visit May 2024.   Referred by PCP \"ASAP\" to neurology for memory problems x2.5 years. Was started on memantine by PCP but insurance wouldn't cover. Was started on donepezil instead.      Hearing poor.     S/p CABG ~3 years ago. Since then memory has been bad. Short term memory is troublesome. Lives with wife. Driving. Not getting lost. Denies problems driving. Not leaving the faucet on or stove on. Bills on autopay. No hallucinations.     Tried donepezil for a few days, \"head felt funny\". ? Dizzy. He stopped it.     No sz. No stroke. Balance ok. No falls.      Hearing bad for 2 years. Says hearing aids are a joke--not happy with them.     No B12/TSH. Not diabetic.     5/2022 MRI IAC wwo done ok, there was some vol loss noted. Nothing significant happened medically since then.     SLUMS 10/30 on 5/22/24     Denies anxiety/depression. Was a .     Mother had dementia symptoms at age 60s, passed away at 68 yo.     Former smoker. Drank alcohol, last was 33 years ago.      Pt/spouse did not disclose but found on review of records after visit, pt had seen Dr Perez in [facility] in 2022 for same problem. Was referred for neuropsych testing-->dx MCI with memory loss. Was advised follow-up with neurologist, but appears to not have followed up.     Pt prescribed donepezil by PCP--had perceived SE and stopped     We discussed about available memory pills and memory IV medication (and possible referral to memory clinic if interested in latter). Realistic expectations discussed. Pt/spouse wants to retry donepezil. States they still have it at home--? Dose. Try donepezil 5mg at bedtime x1 mo, then if no SE, plan go up " "to 10mg at bedtime    24 - B12 elevated, TSH wnl.     Pt and wife feel he is doing well overall.      Did hip surgery for replacement 21 days ago. Healing well per pt. Walking with walker only when he tends to over exert himself. Typically very active and does not like to remain sedentary.      Doing well on donepezil. Taking at night. No insomnia or vivid dreams. No diarrhea.      Mainly short term memory issues, no long term issues.      Mother had early onset of dementia in her 50s,  at age 67. States hers progressed very rapidly.    Tolerating donepezil 5 mg nightly, increase to 10 mg. If experiencing any insomnia or vivid dreams, take in am instead. If diarrhea or GI issues, can go back down to 5 mg. Refills sent.   Stable progression.   Discussed lifestyle modifications to help with memory loss.     25 - presents with wife for today's visit.     Pt thinks he is stable. Wife notices a little bit worse.     Getting tired easily throughout the day.     Trouble putting thoughts together particularly when tired. 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     Gets up at 5, may wake up early and cannot get back to sleep. Goes to bed around 9-9:30. Falls asleep quickly.     Not doing much activity with winter but likes to stay active otherwise.     He does raise concern that his mother passed away at 70 years old, last 6-8 years of life was difficult - states she \"was basically a vegetable.\" States she could not recognize anyone for years. He is worried that his will progress to that point.     Still driving, not getting lost. Wife rides with him, states she has no concerns about his driving. No accidents.     REVIEW OF SYSTEMS:  10 point review of systems performed and is negative except as noted in the HPI.     Past Medical History:   Diagnosis Date    Anxiety     Cognitive disorder     SHORT TERM MEMORY    Coronary artery disease     HLD (hyperlipidemia)     PSVT (paroxysmal " supraventricular tachycardia) (CMS-HCC)     Vertigo     SINUS       Family medical history includes dementia in mother.    Past Surgical History:   Procedure Laterality Date    CORONARY ARTERY BYPASS GRAFT  2021    CABG x4 (L-LAD, S-AM and PDA, Radial-OM1)    OTHER SURGICAL HISTORY  11/05/2018    INGUINAL WITH MESH       Social History     Substance and Sexual Activity   Drug Use Never     Tobacco Use: Medium Risk (2/21/2025)    Patient History     Smoking Tobacco Use: Former     Smokeless Tobacco Use: Never     Passive Exposure: Not on file     Alcohol Use: Not At Risk (7/31/2024)    AUDIT-C     Frequency of Alcohol Consumption: Never     Average Number of Drinks: Patient does not drink     Frequency of Binge Drinking: Never       Current Outpatient Medications   Medication Instructions    acetaminophen (TYLENOL) 975 mg, oral, Every 8 hours PRN    atorvastatin (LIPITOR) 40 mg, oral, Daily    cholecalciferol (VITAMIN D3) 50 mcg, Daily    cyanocobalamin (VITAMIN B-12) 1,000 mcg, Daily    donepezil (ARICEPT) 10 mg, oral, Nightly    memantine (NAMENDA) 5 mg, oral, 2 times daily    multivitamin (Daily Multi-Vitamin) tablet Daily RT         OBJECTIVE    BP (!) 132/94   Pulse 58   Wt 94.3 kg (207 lb 12.8 oz)   BMI 29.82 kg/m²       Physical Exam  Psychiatric:         Speech: Speech normal.       Neurological Exam  Mental Status  Awake, alert and oriented to person, place and time. Speech is normal. Language: Sentence structure is affected, will seem to start mid sentence and then back up to add other information. Attention and concentration are normal. Fund of knowledge is appropriate for level of education.    Cranial Nerves  CN II-XII grossly intact.    Motor  Normal muscle bulk throughout. No fasciculations present. Normal muscle tone. No abnormal involuntary movements.  Strength at least antigravity throughout.    Sensory  Light touch is normal in upper and lower extremities.     Coordination  Right: Finger-to-nose  normal.Left: Finger-to-nose normal.    Gait  Casual gait is normal including stance, stride, and arm swing.          Lab Results   Component Value Date    WBC 8.7 12/09/2024    RBC 5.03 12/09/2024    HGB 15.1 12/09/2024    HCT 45.8 12/09/2024     12/09/2024     12/09/2024    K 4.4 12/09/2024     (H) 12/09/2024    BUN 14 12/09/2024    CREATININE 0.86 12/09/2024    EGFR >90 12/09/2024    CALCIUM 9.7 12/09/2024    ALKPHOS 64 12/09/2024    AST 29 12/09/2024    ALT 23 12/09/2024    MG 2.11 11/28/2021    [id number] 2,323 (H) 05/22/2024    HGBA1C 5.7 (H) 12/09/2024    LDLCALC 58 12/09/2024    CHOL 143 12/09/2024    HDL 66.6 12/09/2024    TRIG 92 12/09/2024    TSH 1.46 05/22/2024          ASSESSMENT & PLAN  Problem List Items Addressed This Visit       MCI (mild cognitive impairment) with memory loss - Primary    Overview     SLUMS 10/30 on 5/22/24  S/p neuropsych testing 03/2023  Pt states only problem is short term memory  Cannot exclude contribution of hearing problem to memory issue  2022 MRI IAC wwo with mild to mod vol loss  B12 and TSH not issue  On donepezil 10 mg         Relevant Medications    memantine (Namenda) 5 mg tablet    Other Relevant Orders    Referral to Neurology     Discussed options, tolerating donepezil. Will add in memantine 5 mg BID. Discussed concerns for progression and treatments. Pt and wife seem hesitant about newer IV infusion medications available for AD but would like to explore further with cognitive neurology team. Referral placed.     FU  here pending eval by cognitive neurology, may opt to continue follow up there.           Jolie Schmitt, APRN-CNP

## 2025-05-19 ENCOUNTER — APPOINTMENT (OUTPATIENT)
Dept: PRIMARY CARE | Facility: CLINIC | Age: 70
End: 2025-05-19
Payer: MEDICARE

## 2025-05-19 VITALS
HEIGHT: 70 IN | BODY MASS INDEX: 29.06 KG/M2 | DIASTOLIC BLOOD PRESSURE: 68 MMHG | TEMPERATURE: 98 F | OXYGEN SATURATION: 96 % | SYSTOLIC BLOOD PRESSURE: 104 MMHG | WEIGHT: 203 LBS | HEART RATE: 68 BPM

## 2025-05-19 DIAGNOSIS — E78.1 HYPERTRIGLYCERIDEMIA: ICD-10-CM

## 2025-05-19 DIAGNOSIS — S76.212A STRAIN OF LEFT GROIN: Primary | ICD-10-CM

## 2025-05-19 DIAGNOSIS — Z95.1 S/P CABG X 4: ICD-10-CM

## 2025-05-19 DIAGNOSIS — I25.10 CAD, MULTIPLE VESSEL: ICD-10-CM

## 2025-05-19 PROCEDURE — 3074F SYST BP LT 130 MM HG: CPT | Performed by: FAMILY MEDICINE

## 2025-05-19 PROCEDURE — 1036F TOBACCO NON-USER: CPT | Performed by: FAMILY MEDICINE

## 2025-05-19 PROCEDURE — 3008F BODY MASS INDEX DOCD: CPT | Performed by: FAMILY MEDICINE

## 2025-05-19 PROCEDURE — 1159F MED LIST DOCD IN RCRD: CPT | Performed by: FAMILY MEDICINE

## 2025-05-19 PROCEDURE — 99214 OFFICE O/P EST MOD 30 MIN: CPT | Performed by: FAMILY MEDICINE

## 2025-05-19 PROCEDURE — 3078F DIAST BP <80 MM HG: CPT | Performed by: FAMILY MEDICINE

## 2025-05-19 RX ORDER — ATORVASTATIN CALCIUM 40 MG/1
40 TABLET, FILM COATED ORAL DAILY
Qty: 90 TABLET | Refills: 1 | Status: SHIPPED | OUTPATIENT
Start: 2025-05-19 | End: 2025-11-15

## 2025-05-19 NOTE — PROGRESS NOTES
"Subjective   Patient ID: Osmar Pond is a 69 y.o. male who presents for Groin Pain.    HPI Intermittent left sided groin pain that's been going on for a while.     Review of Systems   All other systems reviewed and are negative.      Objective   /68   Pulse 68   Temp 36.7 °C (98 °F)   Ht 1.778 m (5' 10\")   Wt 92.1 kg (203 lb)   SpO2 96%   BMI 29.13 kg/m²     Physical Exam  Constitutional:       Appearance: Normal appearance.   Eyes:      Conjunctiva/sclera: Conjunctivae normal.   Cardiovascular:      Rate and Rhythm: Normal rate and regular rhythm.   Pulmonary:      Effort: Pulmonary effort is normal.      Breath sounds: Normal breath sounds.   Abdominal:      Palpations: Abdomen is soft.   Musculoskeletal:         General: Normal range of motion.   Skin:     General: Skin is warm and dry.   Neurological:      Mental Status: He is alert.   Psychiatric:         Mood and Affect: Mood normal.         Assessment/Plan   Diagnoses and all orders for this visit:  Strain of left groin        - No mass on exam. Discussed stretching before exercise. RTC if pain not resolved in 6-8 weeks  CAD, multiple vessel  -     atorvastatin (Lipitor) 40 mg tablet; Take 1 tablet (40 mg) by mouth once daily.  S/P CABG x 4  -     atorvastatin (Lipitor) 40 mg tablet; Take 1 tablet (40 mg) by mouth once daily.  Hypertriglyceridemia  -     atorvastatin (Lipitor) 40 mg tablet; Take 1 tablet (40 mg) by mouth once daily.  -     Comprehensive metabolic panel; Future  -     Lipid panel; Future       "

## 2025-05-20 LAB
ALBUMIN SERPL-MCNC: 4.2 G/DL (ref 3.6–5.1)
ALP SERPL-CCNC: 59 U/L (ref 35–144)
ALT SERPL-CCNC: 19 U/L (ref 9–46)
ANION GAP SERPL CALCULATED.4IONS-SCNC: 8 MMOL/L (CALC) (ref 7–17)
AST SERPL-CCNC: 25 U/L (ref 10–35)
BILIRUB SERPL-MCNC: 0.6 MG/DL (ref 0.2–1.2)
BUN SERPL-MCNC: 19 MG/DL (ref 7–25)
CALCIUM SERPL-MCNC: 9.4 MG/DL (ref 8.6–10.3)
CHLORIDE SERPL-SCNC: 106 MMOL/L (ref 98–110)
CHOLEST SERPL-MCNC: 161 MG/DL
CHOLEST/HDLC SERPL: 2.6 (CALC)
CO2 SERPL-SCNC: 27 MMOL/L (ref 20–32)
CREAT SERPL-MCNC: 0.75 MG/DL (ref 0.7–1.35)
EGFRCR SERPLBLD CKD-EPI 2021: 99 ML/MIN/1.73M2
GLUCOSE SERPL-MCNC: 80 MG/DL (ref 65–99)
HDLC SERPL-MCNC: 63 MG/DL
LDLC SERPL CALC-MCNC: 82 MG/DL (CALC)
NONHDLC SERPL-MCNC: 98 MG/DL (CALC)
POTASSIUM SERPL-SCNC: 4.8 MMOL/L (ref 3.5–5.3)
PROT SERPL-MCNC: 6.7 G/DL (ref 6.1–8.1)
SODIUM SERPL-SCNC: 141 MMOL/L (ref 135–146)
TRIGL SERPL-MCNC: 84 MG/DL

## 2025-06-06 ENCOUNTER — APPOINTMENT (OUTPATIENT)
Dept: CARDIOLOGY | Facility: CLINIC | Age: 70
End: 2025-06-06
Payer: MEDICARE

## 2025-07-29 ENCOUNTER — APPOINTMENT (OUTPATIENT)
Dept: ORTHOPEDIC SURGERY | Facility: CLINIC | Age: 70
End: 2025-07-29
Payer: MEDICARE

## 2025-07-29 ENCOUNTER — HOSPITAL ENCOUNTER (OUTPATIENT)
Dept: RADIOLOGY | Facility: CLINIC | Age: 70
Discharge: HOME | End: 2025-07-29
Payer: MEDICARE

## 2025-07-29 VITALS — BODY MASS INDEX: 28.63 KG/M2 | HEIGHT: 70 IN | WEIGHT: 200 LBS

## 2025-07-29 DIAGNOSIS — M70.61 TROCHANTERIC BURSITIS OF RIGHT HIP: ICD-10-CM

## 2025-07-29 DIAGNOSIS — M16.0 PRIMARY OSTEOARTHRITIS OF BOTH HIPS: Primary | ICD-10-CM

## 2025-07-29 PROCEDURE — 1159F MED LIST DOCD IN RCRD: CPT | Performed by: ORTHOPAEDIC SURGERY

## 2025-07-29 PROCEDURE — 99213 OFFICE O/P EST LOW 20 MIN: CPT | Performed by: ORTHOPAEDIC SURGERY

## 2025-07-29 PROCEDURE — 3008F BODY MASS INDEX DOCD: CPT | Performed by: ORTHOPAEDIC SURGERY

## 2025-07-29 PROCEDURE — 73502 X-RAY EXAM HIP UNI 2-3 VIEWS: CPT | Mod: RT

## 2025-07-29 PROCEDURE — 73502 X-RAY EXAM HIP UNI 2-3 VIEWS: CPT | Mod: RIGHT SIDE | Performed by: RADIOLOGY

## 2025-07-29 PROCEDURE — 1125F AMNT PAIN NOTED PAIN PRSNT: CPT | Performed by: ORTHOPAEDIC SURGERY

## 2025-07-29 ASSESSMENT — PAIN SCALES - GENERAL: PAINLEVEL_OUTOF10: 4

## 2025-07-29 ASSESSMENT — PAIN - FUNCTIONAL ASSESSMENT: PAIN_FUNCTIONAL_ASSESSMENT: 0-10

## 2025-07-29 ASSESSMENT — PAIN DESCRIPTION - DESCRIPTORS: DESCRIPTORS: ACHING;SHARP;SHOOTING

## 2025-07-29 NOTE — PROGRESS NOTES
ORTHOPEDIC TOTAL JOINT  POST-OPERATIVE FOLLOW UP      ============================  IMPRESSION/PLAN:  ============================  70 y.o. male s/p Right Total Hip Replacement completed on 7/31/2024    PLAN:  -Weightbearing: Weight bearing as tolerated  -DVT Prophylaxis: No longer needed  -Radiology Studies: Reviewed today  -Therapies: Continue regular exercise program.  I do think some the pain that he is getting recently is coming from his back.  He states it is tolerable and intermittent in nature but we did give him some names to follow-up with one of our spine partners in the future if necessary  -Follow-up as needed        Osmar Pond presents today for follow up of joint replacement above. CLARISA done 07/31/24   Pain has been consistent for around the last 3 weeks describes as sharp and shooting. they are currently ambulating with no assistance.  XR done today.    Review of Systems:   Constitutional: See HPI for pain assessment, No significant weight loss, recent trauma. Denies fevers/chills  Cardiovascular: No chest pain, shortness of breath  Respiratory: No difficulty breathing, cough  Gastrointestinal: No nausea, vomiting, diarrhea, constipation  Musculoskeletal: Noted in HPI, no arthralgias   Integumentary: No rashes, easy bruising, redness   Neurological: no numbness or tingling in extremities, no gait disturbances     Patient Active Problem List   Diagnosis    Abnormal cardiovascular function study    Bradycardia by electrocardiography    Abnormal EKG    Acute sinusitis    Allergies    Anxiety    Arthritis    Asymmetrical sensorineural hearing loss    Sensorineural hearing loss, bilateral    Benign paroxysmal positional vertigo    Tinnitus of both ears    Multiple vessel coronary artery disease    Daytime somnolence    Disturbance in sleep behavior    Leukocytosis    Hyperlipidemia    Hypertriglyceridemia    Hypertension    Iliotibial band syndrome of right side    Amnesia    Obstructive sleep apnea  syndrome    PVC (premature ventricular contraction)    S/P CABG x 4    Seasonal allergic rhinitis    Increased glucose level    Syncope    Trochanteric bursitis of right hip    Supraventricular tachycardia, paroxysmal    Ventricular tachycardia (paroxysmal)    Vertigo of central origin    Impaired cognition    Overweight with body mass index (BMI) of 28 to 28.9 in adult    Dizziness    Malignant melanoma of upper arm (Multi)    Greater trochanteric bursitis    Benign prostatic hyperplasia with urinary obstruction    Disorder of diaphragm    Disorder of paranasal sinus    Left lower quadrant abdominal pain    Nasal discharge    Rosacea    Sensation of chest tightness    MCI (mild cognitive impairment) with memory loss    Chronic rhinitis    Primary osteoarthritis of both hips    Preoperative cardiovascular examination    Osteoarthritis       =================================  EXAM  =================================  GENERAL: A/Ox3, NAD. Appears healthy, well nourished  CARDIAC: regular rate  LUNGS: Breathing non-labored    MUSCULOSKELETAL:  Laterality: right Hip exam  - Strength: Abduction 5/5, Flexion 5/5  - Palpation: non TTP along surgical site  - Incision: No overlying, erythema, induration, or drainage. Incision healing well with no wound dehiscence  - EHL/PF/DF motor intact  - compartments soft, negative homans  - Gait: using no assistive device    NEUROVASCULAR:  - Neurovascular Status: sensation intact to light touch distally  - Capillary refill brisk at extremities, Bilateral dorsalis pedis pulse 2+     IMAGING: Multi views of right hip and pelvis reviewed today which demonstrates no signs of loosening or failure right total hip arthroplasty. X-rays were personally reviewed by me.  Radiology reports were reviewed by me as well, if available at the time.        Kanika Hallman DO  Attending Surgeon  Joint Replacement and Adult Reconstructive Surgery  [place], [place]

## 2025-08-11 DIAGNOSIS — G31.84 MCI (MILD COGNITIVE IMPAIRMENT) WITH MEMORY LOSS: ICD-10-CM

## 2025-08-11 RX ORDER — DONEPEZIL HYDROCHLORIDE 10 MG/1
10 TABLET, FILM COATED ORAL NIGHTLY
Qty: 90 TABLET | Refills: 3 | Status: SHIPPED | OUTPATIENT
Start: 2025-08-11 | End: 2026-08-11

## 2025-08-11 RX ORDER — MEMANTINE HYDROCHLORIDE 5 MG/1
5 TABLET ORAL 2 TIMES DAILY
Qty: 180 TABLET | Refills: 3 | Status: SHIPPED | OUTPATIENT
Start: 2025-08-11 | End: 2026-08-11

## 2025-08-15 ENCOUNTER — APPOINTMENT (OUTPATIENT)
Dept: CARDIOLOGY | Facility: CLINIC | Age: 70
End: 2025-08-15
Payer: MEDICARE

## 2025-08-15 VITALS
SYSTOLIC BLOOD PRESSURE: 130 MMHG | WEIGHT: 201.8 LBS | DIASTOLIC BLOOD PRESSURE: 66 MMHG | HEART RATE: 43 BPM | BODY MASS INDEX: 28.89 KG/M2 | HEIGHT: 70 IN

## 2025-08-15 DIAGNOSIS — Z95.1 S/P CABG X 4: ICD-10-CM

## 2025-08-15 DIAGNOSIS — I10 HYPERTENSION, UNSPECIFIED TYPE: ICD-10-CM

## 2025-08-15 DIAGNOSIS — I49.3 FREQUENT PVCS: Primary | ICD-10-CM

## 2025-08-15 DIAGNOSIS — I25.10 ASHD (ARTERIOSCLEROTIC HEART DISEASE): ICD-10-CM

## 2025-08-15 DIAGNOSIS — R00.1 BRADYCARDIA BY ELECTROCARDIOGRAPHY: ICD-10-CM

## 2025-08-15 PROCEDURE — 3075F SYST BP GE 130 - 139MM HG: CPT | Performed by: INTERNAL MEDICINE

## 2025-08-15 PROCEDURE — 1159F MED LIST DOCD IN RCRD: CPT | Performed by: INTERNAL MEDICINE

## 2025-08-15 PROCEDURE — 3078F DIAST BP <80 MM HG: CPT | Performed by: INTERNAL MEDICINE

## 2025-08-15 PROCEDURE — 93000 ELECTROCARDIOGRAM COMPLETE: CPT | Performed by: INTERNAL MEDICINE

## 2025-08-15 PROCEDURE — 99214 OFFICE O/P EST MOD 30 MIN: CPT | Performed by: INTERNAL MEDICINE

## 2025-08-15 PROCEDURE — 3008F BODY MASS INDEX DOCD: CPT | Performed by: INTERNAL MEDICINE

## 2025-08-28 ENCOUNTER — ANCILLARY PROCEDURE (OUTPATIENT)
Dept: CARDIOLOGY | Facility: HOSPITAL | Age: 70
End: 2025-08-28
Payer: MEDICARE

## 2025-09-02 ENCOUNTER — OFFICE VISIT (OUTPATIENT)
Dept: NEUROLOGY | Facility: HOSPITAL | Age: 70
End: 2025-09-02
Payer: MEDICARE

## 2025-09-02 VITALS
WEIGHT: 198.41 LBS | HEIGHT: 70 IN | RESPIRATION RATE: 14 BRPM | BODY MASS INDEX: 28.41 KG/M2 | SYSTOLIC BLOOD PRESSURE: 127 MMHG | DIASTOLIC BLOOD PRESSURE: 78 MMHG | HEART RATE: 55 BPM

## 2025-09-02 DIAGNOSIS — G31.84 MCI (MILD COGNITIVE IMPAIRMENT) WITH MEMORY LOSS: Primary | ICD-10-CM

## 2025-09-02 DIAGNOSIS — G30.9 ALZHEIMER'S DISEASE, UNSPECIFIED: ICD-10-CM

## 2025-09-02 DIAGNOSIS — F02.80 ALZHEIMER'S DISEASE, UNSPECIFIED: ICD-10-CM

## 2025-09-02 PROCEDURE — 1126F AMNT PAIN NOTED NONE PRSNT: CPT | Performed by: PSYCHIATRY & NEUROLOGY

## 2025-09-02 PROCEDURE — G2211 COMPLEX E/M VISIT ADD ON: HCPCS | Performed by: PSYCHIATRY & NEUROLOGY

## 2025-09-02 PROCEDURE — G2212 PROLONG OUTPT/OFFICE VIS: HCPCS | Performed by: PSYCHIATRY & NEUROLOGY

## 2025-09-02 PROCEDURE — 3074F SYST BP LT 130 MM HG: CPT | Performed by: PSYCHIATRY & NEUROLOGY

## 2025-09-02 PROCEDURE — 99205 OFFICE O/P NEW HI 60 MIN: CPT | Performed by: PSYCHIATRY & NEUROLOGY

## 2025-09-02 PROCEDURE — 99212 OFFICE O/P EST SF 10 MIN: CPT

## 2025-09-02 PROCEDURE — 1159F MED LIST DOCD IN RCRD: CPT | Performed by: PSYCHIATRY & NEUROLOGY

## 2025-09-02 PROCEDURE — 3078F DIAST BP <80 MM HG: CPT | Performed by: PSYCHIATRY & NEUROLOGY

## 2025-09-02 PROCEDURE — 1036F TOBACCO NON-USER: CPT | Performed by: PSYCHIATRY & NEUROLOGY

## 2025-09-02 PROCEDURE — 3008F BODY MASS INDEX DOCD: CPT | Performed by: PSYCHIATRY & NEUROLOGY

## 2025-09-02 RX ORDER — DONEPEZIL HYDROCHLORIDE 10 MG/1
10 TABLET, FILM COATED ORAL NIGHTLY
Qty: 90 TABLET | Refills: 3 | Status: SHIPPED | OUTPATIENT
Start: 2025-09-02 | End: 2026-09-02

## 2025-09-02 ASSESSMENT — MONTREAL COGNITIVE ASSESSMENT (MOCA)
10. [FLUENCY] NAME WORDS STARTING WITH DESIGNATED LETTER: 0
5. MEMORY TRIALS: 0
4. NAME EACH OF THE THREE ANIMALS SHOWN: 1
7. [VIGILENCE] TAP WHEN HEARING DESIGNATED LETTER: 0
13. ORIENTATION SUBSCORE: 6
WHAT LEVEL OF EDUCATION WAS ATTAINED: 0
WHAT IS THE TOTAL SCORE (OUT OF 30): 13
8. SERIAL SUBTRACTION OF 7S: 3
VISUOSPATIAL/EXECUTIVE SUBSCORE: 2
6. READ LIST OF DIGITS [FORWARD/BACKWARD]: 1
9. REPEAT EACH SENTENCE: 0
11. FOR EACH PAIR OF WORDS, WHAT CATEGORY DO THEY BELONG TO (OUT OF 2): 0
12. MEMORY INDEX SCORE: 0

## 2025-09-02 ASSESSMENT — ENCOUNTER SYMPTOMS
DEPRESSION: 0
LOSS OF SENSATION IN FEET: 0
OCCASIONAL FEELINGS OF UNSTEADINESS: 0

## 2025-09-02 ASSESSMENT — PAIN SCALES - GENERAL: PAINLEVEL_OUTOF10: 0-NO PAIN

## 2025-09-23 ENCOUNTER — APPOINTMENT (OUTPATIENT)
Dept: RADIOLOGY | Facility: CLINIC | Age: 70
End: 2025-09-23
Payer: MEDICARE

## 2026-02-06 ENCOUNTER — APPOINTMENT (OUTPATIENT)
Dept: CARDIOLOGY | Facility: CLINIC | Age: 71
End: 2026-02-06
Payer: MEDICARE

## (undated) DEVICE — TIP, SUCTION, FRAZIER, EXTRA WIDE, W/O CONTROL VENT, W/OBTURATOR, 18 FR, DISPOSABLE

## (undated) DEVICE — DRAPE, SHEET, U, STERI DRAPE, 47 X 51 IN, DISPOSABLE, STERILE

## (undated) DEVICE — DRAPE, INCISE, ANTIMICROBIAL, IOBAN 2, STERI DRAPE, 23 X 33 IN, DISPOSABLE, STERILE

## (undated) DEVICE — CLOSURE SYSTEM, DERMABOND, PRINEO, 22CM, STERILE

## (undated) DEVICE — SUTURE, VICRYL, 1, 36 IN, CT-1, UNDYED

## (undated) DEVICE — GLOVE, SURGICAL, PI ORTHO PRO, BIOGEL, SZ 8.0

## (undated) DEVICE — SOLUTION, IRRIGATION, SODIUM CHLORIDE 0.9%, 1000 ML, POUR BOTTLE

## (undated) DEVICE — SUTURE, STRATAFIX, SYMMETRIC, SIZE 1, 45CM, KNOTLESS

## (undated) DEVICE — DRAPE, INSTRUMENT, W/POUCH, STERI DRAPE, 7 X 11 IN, DISPOSABLE, STERILE

## (undated) DEVICE — SUTURE, STRATAFIX, SPIRAL MONOCRYL PLUS, 3-0, PS-1 45CM, UNDYED

## (undated) DEVICE — DRAPE, INCISE, ANTIMICROBIAL, IOBAN 2, LARGE, 17 X 23 IN, DISPOSABLE, STERILE

## (undated) DEVICE — SUTURE, VICRYL, 2-0, 36 IN, CT-1, UNDYED

## (undated) DEVICE — Device

## (undated) DEVICE — BLADE, SAW, OSC TIP, FALCON, 19.5 X 1.28 X 90MM

## (undated) DEVICE — COVER HANDLE LIGHT, STERIS, BLUE, STERILE

## (undated) DEVICE — KIT, TOTAL JOINT, CUSTOM, PORTAGE

## (undated) DEVICE — DRAPE, C-ARM IMAGE

## (undated) DEVICE — DRILL BIT, 3.3 X 25MM

## (undated) DEVICE — DRAPE, SHEET, THREE QUARTER, FAN FOLD, 57 X 77 IN

## (undated) DEVICE — DRESSING, MEPILEX BORDER, POST-OP AG, 4 X 10 IN

## (undated) DEVICE — DRAPE, TIBURON, HIP W/POUCHES

## (undated) DEVICE — SUTURE, VICRYL, 1, 27 IN, CT-1 CR, UNDYED

## (undated) DEVICE — HOOD, SURGICAL, FLYTE, T7 PLUS, PEEL AWAY SHIELD

## (undated) DEVICE — APPLICATOR, CHLORAPREP, W/ORANGE TINT, 26ML

## (undated) DEVICE — GLOVE, PROTEXIS PI CLASSIC, SZ-7.5, PF, LF

## (undated) DEVICE — SYRINGE, 50 CC, LUER LOCK

## (undated) DEVICE — PAD, GROUNDING, ELECTROSURGICAL, W/9 FT CABLE, POLYHESIVE II, ADULT, LF

## (undated) DEVICE — COVER, MAYO STAND, W/PAD, 23 IN, DISPOSABLE, PLASTIC, LF, STERILE

## (undated) DEVICE — NEEDLE, HYPODERMIC, REGULAR WALL, REGULAR BEVEL, 18 G X 1.5 IN

## (undated) DEVICE — CONTAINER, SPECIMEN, 4 OZ, OR PEEL PACK, STERILE

## (undated) DEVICE — ELECTRODE, ELECTROSURGICAL, BLADE, EXTENDED